# Patient Record
Sex: FEMALE | Race: BLACK OR AFRICAN AMERICAN | URBAN - METROPOLITAN AREA
[De-identification: names, ages, dates, MRNs, and addresses within clinical notes are randomized per-mention and may not be internally consistent; named-entity substitution may affect disease eponyms.]

---

## 2017-03-02 ENCOUNTER — OFFICE VISIT (OUTPATIENT)
Dept: INTERNAL MEDICINE CLINIC | Age: 67
End: 2017-03-02

## 2017-03-02 VITALS
WEIGHT: 127 LBS | OXYGEN SATURATION: 99 % | SYSTOLIC BLOOD PRESSURE: 118 MMHG | HEART RATE: 85 BPM | BODY MASS INDEX: 22.5 KG/M2 | DIASTOLIC BLOOD PRESSURE: 69 MMHG | TEMPERATURE: 96.5 F

## 2017-03-02 DIAGNOSIS — G30.0 EARLY ONSET ALZHEIMER'S DEMENTIA WITHOUT BEHAVIORAL DISTURBANCE (HCC): ICD-10-CM

## 2017-03-02 DIAGNOSIS — Z00.00 PREVENTATIVE HEALTH CARE: ICD-10-CM

## 2017-03-02 DIAGNOSIS — E83.52 HYPERCALCEMIA: ICD-10-CM

## 2017-03-02 DIAGNOSIS — F02.80 EARLY ONSET ALZHEIMER'S DEMENTIA WITHOUT BEHAVIORAL DISTURBANCE (HCC): ICD-10-CM

## 2017-03-02 DIAGNOSIS — Z12.31 ENCOUNTER FOR SCREENING MAMMOGRAM FOR MALIGNANT NEOPLASM OF BREAST: ICD-10-CM

## 2017-03-02 DIAGNOSIS — G93.32 CHRONIC FATIGUE SYNDROME: ICD-10-CM

## 2017-03-02 DIAGNOSIS — E55.9 VITAMIN D DEFICIENCY: ICD-10-CM

## 2017-03-02 DIAGNOSIS — M81.0 OSTEOPOROSIS: ICD-10-CM

## 2017-03-02 DIAGNOSIS — M81.0 OSTEOPOROSIS: Primary | ICD-10-CM

## 2017-03-02 DIAGNOSIS — R79.89 OTHER SPECIFIED ABNORMAL FINDINGS OF BLOOD CHEMISTRY: ICD-10-CM

## 2017-03-02 LAB
ALBUMIN SERPL-MCNC: 4.6 G/DL (ref 3.4–5)
ANION GAP SERPL CALCULATED.3IONS-SCNC: 12 MMOL/L (ref 3–16)
BASOPHILS ABSOLUTE: 0 K/UL (ref 0–0.2)
BASOPHILS RELATIVE PERCENT: 0.3 %
BUN BLDV-MCNC: 12 MG/DL (ref 7–20)
CALCIUM SERPL-MCNC: 10.1 MG/DL (ref 8.3–10.6)
CHLORIDE BLD-SCNC: 99 MMOL/L (ref 99–110)
CHOLESTEROL, TOTAL: 285 MG/DL (ref 0–199)
CO2: 26 MMOL/L (ref 21–32)
CREAT SERPL-MCNC: 0.7 MG/DL (ref 0.6–1.2)
EOSINOPHILS ABSOLUTE: 0.1 K/UL (ref 0–0.6)
EOSINOPHILS RELATIVE PERCENT: 1 %
GFR AFRICAN AMERICAN: >60
GFR NON-AFRICAN AMERICAN: >60
GLUCOSE BLD-MCNC: 84 MG/DL (ref 70–99)
HCT VFR BLD CALC: 43.9 % (ref 36–48)
HDLC SERPL-MCNC: 70 MG/DL (ref 40–60)
HEMOGLOBIN: 14.3 G/DL (ref 12–16)
LDL CHOLESTEROL CALCULATED: 181 MG/DL
LYMPHOCYTES ABSOLUTE: 3.3 K/UL (ref 1–5.1)
LYMPHOCYTES RELATIVE PERCENT: 38.3 %
MCH RBC QN AUTO: 29.9 PG (ref 26–34)
MCHC RBC AUTO-ENTMCNC: 32.6 G/DL (ref 31–36)
MCV RBC AUTO: 91.7 FL (ref 80–100)
MONOCYTES ABSOLUTE: 0.5 K/UL (ref 0–1.3)
MONOCYTES RELATIVE PERCENT: 5.7 %
NEUTROPHILS ABSOLUTE: 4.7 K/UL (ref 1.7–7.7)
NEUTROPHILS RELATIVE PERCENT: 54.7 %
PARATHYROID HORMONE INTACT: 115.7 PG/ML (ref 14–72)
PDW BLD-RTO: 14 % (ref 12.4–15.4)
PHOSPHORUS: 3.5 MG/DL (ref 2.5–4.9)
PLATELET # BLD: 277 K/UL (ref 135–450)
PMV BLD AUTO: 9.3 FL (ref 5–10.5)
POTASSIUM SERPL-SCNC: 4.8 MMOL/L (ref 3.5–5.1)
RBC # BLD: 4.78 M/UL (ref 4–5.2)
SODIUM BLD-SCNC: 137 MMOL/L (ref 136–145)
T4 FREE: 1.1 NG/DL (ref 0.9–1.8)
TRIGL SERPL-MCNC: 168 MG/DL (ref 0–150)
TSH SERPL DL<=0.05 MIU/L-ACNC: 1.5 UIU/ML (ref 0.27–4.2)
VLDLC SERPL CALC-MCNC: 34 MG/DL
WBC # BLD: 8.6 K/UL (ref 4–11)

## 2017-03-02 PROCEDURE — G8484 FLU IMMUNIZE NO ADMIN: HCPCS | Performed by: INTERNAL MEDICINE

## 2017-03-02 PROCEDURE — 3017F COLORECTAL CA SCREEN DOC REV: CPT | Performed by: INTERNAL MEDICINE

## 2017-03-02 PROCEDURE — 1036F TOBACCO NON-USER: CPT | Performed by: INTERNAL MEDICINE

## 2017-03-02 PROCEDURE — 4005F PHARM THX FOR OP RXD: CPT | Performed by: INTERNAL MEDICINE

## 2017-03-02 PROCEDURE — G8427 DOCREV CUR MEDS BY ELIG CLIN: HCPCS | Performed by: INTERNAL MEDICINE

## 2017-03-02 PROCEDURE — G8420 CALC BMI NORM PARAMETERS: HCPCS | Performed by: INTERNAL MEDICINE

## 2017-03-02 PROCEDURE — 99204 OFFICE O/P NEW MOD 45 MIN: CPT | Performed by: INTERNAL MEDICINE

## 2017-03-02 PROCEDURE — 3014F SCREEN MAMMO DOC REV: CPT | Performed by: INTERNAL MEDICINE

## 2017-03-02 RX ORDER — DONEPEZIL HYDROCHLORIDE 10 MG/1
10 TABLET, FILM COATED ORAL NIGHTLY
Qty: 90 TABLET | Refills: 1 | Status: SHIPPED | OUTPATIENT
Start: 2017-03-02

## 2017-03-02 RX ORDER — MULTIVIT-MIN/IRON/FOLIC ACID/K 18-600-40
1 CAPSULE ORAL DAILY
Qty: 90 CAPSULE | Refills: 3 | Status: SHIPPED | OUTPATIENT
Start: 2017-03-02

## 2017-03-02 RX ORDER — POLYETHYLENE GLYCOL 3350 17 G/17G
17 POWDER ORAL DAILY
Qty: 1 BOTTLE | Refills: 3 | Status: SHIPPED | OUTPATIENT
Start: 2017-03-02

## 2017-03-02 RX ORDER — ALENDRONATE SODIUM 70 MG/1
70 TABLET ORAL
Qty: 4 TABLET | Refills: 3 | Status: SHIPPED | OUTPATIENT
Start: 2017-03-02

## 2017-03-02 RX ORDER — MAGNESIUM OXIDE/MAG AA CHELATE 300 MG
1 CAPSULE ORAL DAILY
Qty: 90 CAPSULE | Refills: 1 | Status: SHIPPED | OUTPATIENT
Start: 2017-03-02 | End: 2017-03-02 | Stop reason: ALTCHOICE

## 2017-03-02 RX ORDER — MODAFINIL 100 MG/1
100 TABLET ORAL EVERY MORNING
Qty: 30 TABLET | Refills: 1 | Status: SHIPPED | OUTPATIENT
Start: 2017-03-02

## 2017-03-02 RX ORDER — ERGOCALCIFEROL (VITAMIN D2) 1250 MCG
50000 CAPSULE ORAL WEEKLY
Qty: 4 CAPSULE | Refills: 3 | Status: SHIPPED | OUTPATIENT
Start: 2017-03-02

## 2017-03-03 DIAGNOSIS — E83.52 HYPERCALCEMIA: Primary | ICD-10-CM

## 2017-03-03 DIAGNOSIS — E83.52 HYPERCALCEMIA: ICD-10-CM

## 2017-03-03 LAB
ALBUMIN SERPL-MCNC: 4.4 G/DL (ref 3.4–5)
ESTIMATED AVERAGE GLUCOSE: 105.4 MG/DL
HBA1C MFR BLD: 5.3 %
MAGNESIUM: 2 MG/DL (ref 1.8–2.4)
VITAMIN D 25-HYDROXY: 22.6 NG/ML

## 2017-03-04 LAB — RPR: NON REACTIVE

## 2017-03-06 RX ORDER — ATORVASTATIN CALCIUM 40 MG/1
40 TABLET, FILM COATED ORAL DAILY
Qty: 30 TABLET | Refills: 3 | Status: SHIPPED | OUTPATIENT
Start: 2017-03-06

## 2017-05-25 ENCOUNTER — TELEPHONE (OUTPATIENT)
Dept: INTERNAL MEDICINE CLINIC | Age: 67
End: 2017-05-25

## 2025-03-02 ENCOUNTER — APPOINTMENT (OUTPATIENT)
Dept: GENERAL RADIOLOGY | Age: 75
DRG: 194 | End: 2025-03-02
Payer: MEDICARE

## 2025-03-02 ENCOUNTER — APPOINTMENT (OUTPATIENT)
Dept: CT IMAGING | Age: 75
DRG: 194 | End: 2025-03-02
Payer: MEDICARE

## 2025-03-02 ENCOUNTER — HOSPITAL ENCOUNTER (INPATIENT)
Age: 75
LOS: 1 days | Discharge: HOME HEALTH | DRG: 194 | End: 2025-03-03
Attending: STUDENT IN AN ORGANIZED HEALTH CARE EDUCATION/TRAINING PROGRAM | Admitting: INTERNAL MEDICINE
Payer: MEDICARE

## 2025-03-02 DIAGNOSIS — R53.1 GENERAL WEAKNESS: ICD-10-CM

## 2025-03-02 DIAGNOSIS — J10.1 INFLUENZA A: Primary | ICD-10-CM
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
## 2025-03-02 LAB
ALBUMIN SERPL-MCNC: 4.1 G/DL (ref 3.4–5)
ALBUMIN/GLOB SERPL: 1.1 {RATIO} (ref 1.1–2.2)
ALP SERPL-CCNC: 103 U/L (ref 40–129)
ALT SERPL-CCNC: 18 U/L (ref 10–40)
ANION GAP SERPL CALCULATED.3IONS-SCNC: 12 MMOL/L (ref 3–16)
AST SERPL-CCNC: 45 U/L (ref 15–37)
BASE EXCESS BLDV CALC-SCNC: 0.2 MMOL/L (ref -2–3)
BASOPHILS # BLD: 0 K/UL (ref 0–0.2)
BASOPHILS NFR BLD: 0 %
BILIRUB SERPL-MCNC: 0.3 MG/DL (ref 0–1)
BILIRUB UR QL STRIP.AUTO: NEGATIVE
BUN SERPL-MCNC: 9 MG/DL (ref 7–20)
CALCIUM SERPL-MCNC: 9.6 MG/DL (ref 8.3–10.6)
CHLORIDE SERPL-SCNC: 99 MMOL/L (ref 99–110)
CLARITY UR: CLEAR
CO2 BLDV-SCNC: 27 MMOL/L
CO2 SERPL-SCNC: 22 MMOL/L (ref 21–32)
COHGB MFR BLDV: 1.1 % (ref 0–1.5)
COLOR UR: YELLOW
CREAT SERPL-MCNC: 0.8 MG/DL (ref 0.6–1.2)
DEPRECATED RDW RBC AUTO: 13.9 % (ref 12.4–15.4)
DO-HGB MFR BLDV: 27.3 %
EOSINOPHIL # BLD: 0 K/UL (ref 0–0.6)
EOSINOPHIL NFR BLD: 0.2 %
EPI CELLS #/AREA URNS HPF: NORMAL /HPF (ref 0–5)
FLUAV RNA RESP QL NAA+PROBE: DETECTED
FLUBV RNA RESP QL NAA+PROBE: NOT DETECTED
GFR SERPLBLD CREATININE-BSD FMLA CKD-EPI: 78 ML/MIN/{1.73_M2}
GLUCOSE SERPL-MCNC: 122 MG/DL (ref 70–99)
GLUCOSE UR STRIP.AUTO-MCNC: NEGATIVE MG/DL
HCO3 BLDV-SCNC: 26 MMOL/L (ref 24–28)
HCT VFR BLD AUTO: 45.4 % (ref 36–48)
HGB BLD-MCNC: 15.3 G/DL (ref 12–16)
HGB UR QL STRIP.AUTO: ABNORMAL
KETONES UR STRIP.AUTO-MCNC: NEGATIVE MG/DL
LACTATE BLDV-SCNC: 1.9 MMOL/L (ref 0.4–2)
LACTATE BLDV-SCNC: 3.3 MMOL/L (ref 0.4–1.9)
LACTATE BLDV-SCNC: 4.5 MMOL/L (ref 0.4–2)
LEUKOCYTE ESTERASE UR QL STRIP.AUTO: NEGATIVE
LYMPHOCYTES # BLD: 0.5 K/UL (ref 1–5.1)
LYMPHOCYTES NFR BLD: 6.6 %
MCH RBC QN AUTO: 30.7 PG (ref 26–34)
MCHC RBC AUTO-ENTMCNC: 33.7 G/DL (ref 31–36)
MCV RBC AUTO: 91.2 FL (ref 80–100)
METHGB MFR BLDV: 0.1 % (ref 0–1.5)
MONOCYTES # BLD: 0.6 K/UL (ref 0–1.3)
MONOCYTES NFR BLD: 7 %
NEUTROPHILS # BLD: 6.9 K/UL (ref 1.7–7.7)
NEUTROPHILS NFR BLD: 86.2 %
NITRITE UR QL STRIP.AUTO: NEGATIVE
PCO2 BLDV: 45.1 MMHG (ref 41–51)
PH BLDV: 7.37 [PH] (ref 7.35–7.45)
PH UR STRIP.AUTO: 6.5 [PH] (ref 5–8)
PLATELET # BLD AUTO: 233 K/UL (ref 135–450)
PMV BLD AUTO: 8.2 FL (ref 5–10.5)
PO2 BLDV: 38.6 MMHG (ref 25–40)
POTASSIUM SERPL-SCNC: 4.9 MMOL/L (ref 3.5–5.1)
PROCALCITONIN SERPL IA-MCNC: 0.04 NG/ML (ref 0–0.15)
PROT SERPL-MCNC: 7.9 G/DL (ref 6.4–8.2)
PROT UR STRIP.AUTO-MCNC: ABNORMAL MG/DL
RBC # BLD AUTO: 4.98 M/UL (ref 4–5.2)
RBC #/AREA URNS HPF: NORMAL /HPF (ref 0–4)
SAO2 % BLDV: 72 %
SARS-COV-2 RNA RESP QL NAA+PROBE: NOT DETECTED
SODIUM SERPL-SCNC: 133 MMOL/L (ref 136–145)
SP GR UR STRIP.AUTO: 1.02 (ref 1–1.03)
TROPONIN, HIGH SENSITIVITY: 7 NG/L (ref 0–14)
TROPONIN, HIGH SENSITIVITY: 8 NG/L (ref 0–14)
UA COMPLETE W REFLEX CULTURE PNL UR: ABNORMAL
UA DIPSTICK W REFLEX MICRO PNL UR: YES
URN SPEC COLLECT METH UR: ABNORMAL
UROBILINOGEN UR STRIP-ACNC: 0.2 E.U./DL
WBC # BLD AUTO: 8 K/UL (ref 4–11)
WBC #/AREA URNS HPF: NORMAL /HPF (ref 0–5)

## 2025-03-02 PROCEDURE — 36415 COLL VENOUS BLD VENIPUNCTURE: CPT

## 2025-03-02 PROCEDURE — 71260 CT THORAX DX C+: CPT

## 2025-03-02 PROCEDURE — 85025 COMPLETE CBC W/AUTO DIFF WBC: CPT

## 2025-03-02 PROCEDURE — 2580000003 HC RX 258: Performed by: STUDENT IN AN ORGANIZED HEALTH CARE EDUCATION/TRAINING PROGRAM

## 2025-03-02 PROCEDURE — 87636 SARSCOV2 & INF A&B AMP PRB: CPT

## 2025-03-02 PROCEDURE — 80053 COMPREHEN METABOLIC PANEL: CPT

## 2025-03-02 PROCEDURE — 81001 URINALYSIS AUTO W/SCOPE: CPT

## 2025-03-02 PROCEDURE — 1200000000 HC SEMI PRIVATE

## 2025-03-02 PROCEDURE — 2500000003 HC RX 250 WO HCPCS: Performed by: INTERNAL MEDICINE

## 2025-03-02 PROCEDURE — 71045 X-RAY EXAM CHEST 1 VIEW: CPT

## 2025-03-02 PROCEDURE — 84484 ASSAY OF TROPONIN QUANT: CPT

## 2025-03-02 PROCEDURE — 96360 HYDRATION IV INFUSION INIT: CPT

## 2025-03-02 PROCEDURE — 6370000000 HC RX 637 (ALT 250 FOR IP): Performed by: STUDENT IN AN ORGANIZED HEALTH CARE EDUCATION/TRAINING PROGRAM

## 2025-03-02 PROCEDURE — 6370000000 HC RX 637 (ALT 250 FOR IP)

## 2025-03-02 PROCEDURE — 70450 CT HEAD/BRAIN W/O DYE: CPT

## 2025-03-02 PROCEDURE — 84145 PROCALCITONIN (PCT): CPT

## 2025-03-02 PROCEDURE — 96361 HYDRATE IV INFUSION ADD-ON: CPT

## 2025-03-02 PROCEDURE — 82803 BLOOD GASES ANY COMBINATION: CPT

## 2025-03-02 PROCEDURE — 6360000004 HC RX CONTRAST MEDICATION: Performed by: STUDENT IN AN ORGANIZED HEALTH CARE EDUCATION/TRAINING PROGRAM

## 2025-03-02 PROCEDURE — 87040 BLOOD CULTURE FOR BACTERIA: CPT

## 2025-03-02 PROCEDURE — 99285 EMERGENCY DEPT VISIT HI MDM: CPT

## 2025-03-02 PROCEDURE — 83605 ASSAY OF LACTIC ACID: CPT

## 2025-03-02 PROCEDURE — 93005 ELECTROCARDIOGRAM TRACING: CPT | Performed by: STUDENT IN AN ORGANIZED HEALTH CARE EDUCATION/TRAINING PROGRAM

## 2025-03-02 RX ORDER — ATORVASTATIN CALCIUM 40 MG/1
40 TABLET, FILM COATED ORAL DAILY
Status: CANCELLED | OUTPATIENT
Start: 2025-03-03

## 2025-03-02 RX ORDER — POTASSIUM CHLORIDE 1500 MG/1
40 TABLET, EXTENDED RELEASE ORAL PRN
Status: DISCONTINUED | OUTPATIENT
Start: 2025-03-02 | End: 2025-03-02

## 2025-03-02 RX ORDER — SODIUM CHLORIDE 0.9 % (FLUSH) 0.9 %
5-40 SYRINGE (ML) INJECTION EVERY 12 HOURS SCHEDULED
Status: DISCONTINUED | OUTPATIENT
Start: 2025-03-02 | End: 2025-03-03 | Stop reason: HOSPADM

## 2025-03-02 RX ORDER — IOPAMIDOL 755 MG/ML
75 INJECTION, SOLUTION INTRAVASCULAR
Status: COMPLETED | OUTPATIENT
Start: 2025-03-02 | End: 2025-03-02

## 2025-03-02 RX ORDER — ACETAMINOPHEN 650 MG/1
650 SUPPOSITORY RECTAL EVERY 6 HOURS PRN
Status: DISCONTINUED | OUTPATIENT
Start: 2025-03-02 | End: 2025-03-02

## 2025-03-02 RX ORDER — POLYETHYLENE GLYCOL 3350 17 G/17G
17 POWDER, FOR SOLUTION ORAL DAILY PRN
Status: DISCONTINUED | OUTPATIENT
Start: 2025-03-02 | End: 2025-03-02

## 2025-03-02 RX ORDER — MAGNESIUM SULFATE IN WATER 40 MG/ML
2000 INJECTION, SOLUTION INTRAVENOUS PRN
Status: DISCONTINUED | OUTPATIENT
Start: 2025-03-02 | End: 2025-03-02

## 2025-03-02 RX ORDER — MAGNESIUM SULFATE IN WATER 40 MG/ML
2000 INJECTION, SOLUTION INTRAVENOUS PRN
Status: DISCONTINUED | OUTPATIENT
Start: 2025-03-02 | End: 2025-03-03 | Stop reason: HOSPADM

## 2025-03-02 RX ORDER — OSELTAMIVIR PHOSPHATE 75 MG/1
75 CAPSULE ORAL ONCE
Status: COMPLETED | OUTPATIENT
Start: 2025-03-02 | End: 2025-03-02

## 2025-03-02 RX ORDER — VITAMIN B COMPLEX
2000 TABLET ORAL DAILY
Status: DISCONTINUED | OUTPATIENT
Start: 2025-03-03 | End: 2025-03-03 | Stop reason: HOSPADM

## 2025-03-02 RX ORDER — ACETAMINOPHEN 325 MG/1
650 TABLET ORAL EVERY 6 HOURS PRN
Status: DISCONTINUED | OUTPATIENT
Start: 2025-03-02 | End: 2025-03-02

## 2025-03-02 RX ORDER — POLYETHYLENE GLYCOL 3350 17 G/17G
17 POWDER, FOR SOLUTION ORAL DAILY PRN
Status: DISCONTINUED | OUTPATIENT
Start: 2025-03-02 | End: 2025-03-03 | Stop reason: HOSPADM

## 2025-03-02 RX ORDER — MODAFINIL 100 MG/1
100 TABLET ORAL EVERY MORNING
Status: DISCONTINUED | OUTPATIENT
Start: 2025-03-03 | End: 2025-03-03 | Stop reason: HOSPADM

## 2025-03-02 RX ORDER — SODIUM CHLORIDE 0.9 % (FLUSH) 0.9 %
5-40 SYRINGE (ML) INJECTION PRN
Status: DISCONTINUED | OUTPATIENT
Start: 2025-03-02 | End: 2025-03-02

## 2025-03-02 RX ORDER — DONEPEZIL HYDROCHLORIDE 10 MG/1
10 TABLET, FILM COATED ORAL NIGHTLY
Status: DISCONTINUED | OUTPATIENT
Start: 2025-03-02 | End: 2025-03-03 | Stop reason: HOSPADM

## 2025-03-02 RX ORDER — ENOXAPARIN SODIUM 100 MG/ML
40 INJECTION SUBCUTANEOUS DAILY
Status: DISCONTINUED | OUTPATIENT
Start: 2025-03-03 | End: 2025-03-02

## 2025-03-02 RX ORDER — NEPHROCAP 1 MG
1 CAP ORAL DAILY
Status: DISCONTINUED | OUTPATIENT
Start: 2025-03-03 | End: 2025-03-03 | Stop reason: HOSPADM

## 2025-03-02 RX ORDER — ACETAMINOPHEN 650 MG/1
650 SUPPOSITORY RECTAL EVERY 6 HOURS PRN
Status: DISCONTINUED | OUTPATIENT
Start: 2025-03-02 | End: 2025-03-03 | Stop reason: HOSPADM

## 2025-03-02 RX ORDER — POTASSIUM CHLORIDE 7.45 MG/ML
10 INJECTION INTRAVENOUS PRN
Status: DISCONTINUED | OUTPATIENT
Start: 2025-03-02 | End: 2025-03-02

## 2025-03-02 RX ORDER — SODIUM CHLORIDE, SODIUM LACTATE, POTASSIUM CHLORIDE, AND CALCIUM CHLORIDE .6; .31; .03; .02 G/100ML; G/100ML; G/100ML; G/100ML
1000 INJECTION, SOLUTION INTRAVENOUS ONCE
Status: COMPLETED | OUTPATIENT
Start: 2025-03-02 | End: 2025-03-02

## 2025-03-02 RX ORDER — ONDANSETRON 2 MG/ML
4 INJECTION INTRAMUSCULAR; INTRAVENOUS EVERY 6 HOURS PRN
Status: DISCONTINUED | OUTPATIENT
Start: 2025-03-02 | End: 2025-03-02

## 2025-03-02 RX ORDER — ACETAMINOPHEN 325 MG/1
650 TABLET ORAL EVERY 6 HOURS PRN
Status: DISCONTINUED | OUTPATIENT
Start: 2025-03-02 | End: 2025-03-03 | Stop reason: HOSPADM

## 2025-03-02 RX ORDER — 0.9 % SODIUM CHLORIDE 0.9 %
1000 INTRAVENOUS SOLUTION INTRAVENOUS ONCE
Status: COMPLETED | OUTPATIENT
Start: 2025-03-02 | End: 2025-03-02

## 2025-03-02 RX ORDER — POLYETHYLENE GLYCOL 3350 17 G/17G
17 POWDER, FOR SOLUTION ORAL DAILY
Status: DISCONTINUED | OUTPATIENT
Start: 2025-03-03 | End: 2025-03-03 | Stop reason: HOSPADM

## 2025-03-02 RX ORDER — ENOXAPARIN SODIUM 100 MG/ML
40 INJECTION SUBCUTANEOUS DAILY
Status: DISCONTINUED | OUTPATIENT
Start: 2025-03-03 | End: 2025-03-03 | Stop reason: HOSPADM

## 2025-03-02 RX ORDER — SODIUM CHLORIDE 0.9 % (FLUSH) 0.9 %
5-40 SYRINGE (ML) INJECTION EVERY 12 HOURS SCHEDULED
Status: DISCONTINUED | OUTPATIENT
Start: 2025-03-02 | End: 2025-03-02

## 2025-03-02 RX ORDER — ONDANSETRON 2 MG/ML
4 INJECTION INTRAMUSCULAR; INTRAVENOUS EVERY 6 HOURS PRN
Status: DISCONTINUED | OUTPATIENT
Start: 2025-03-02 | End: 2025-03-03 | Stop reason: HOSPADM

## 2025-03-02 RX ORDER — SODIUM CHLORIDE 9 MG/ML
INJECTION, SOLUTION INTRAVENOUS PRN
Status: DISCONTINUED | OUTPATIENT
Start: 2025-03-02 | End: 2025-03-03 | Stop reason: HOSPADM

## 2025-03-02 RX ORDER — SODIUM CHLORIDE 0.9 % (FLUSH) 0.9 %
5-40 SYRINGE (ML) INJECTION PRN
Status: DISCONTINUED | OUTPATIENT
Start: 2025-03-02 | End: 2025-03-03 | Stop reason: HOSPADM

## 2025-03-02 RX ORDER — OSELTAMIVIR PHOSPHATE 30 MG/1
30 CAPSULE ORAL 2 TIMES DAILY
Status: DISCONTINUED | OUTPATIENT
Start: 2025-03-03 | End: 2025-03-03 | Stop reason: HOSPADM

## 2025-03-02 RX ORDER — ONDANSETRON 4 MG/1
4 TABLET, ORALLY DISINTEGRATING ORAL EVERY 8 HOURS PRN
Status: DISCONTINUED | OUTPATIENT
Start: 2025-03-02 | End: 2025-03-02

## 2025-03-02 RX ORDER — POTASSIUM CHLORIDE 7.45 MG/ML
10 INJECTION INTRAVENOUS PRN
Status: DISCONTINUED | OUTPATIENT
Start: 2025-03-02 | End: 2025-03-03 | Stop reason: HOSPADM

## 2025-03-02 RX ORDER — ONDANSETRON 4 MG/1
4 TABLET, ORALLY DISINTEGRATING ORAL EVERY 8 HOURS PRN
Status: DISCONTINUED | OUTPATIENT
Start: 2025-03-02 | End: 2025-03-03 | Stop reason: HOSPADM

## 2025-03-02 RX ORDER — POTASSIUM CHLORIDE 1500 MG/1
40 TABLET, EXTENDED RELEASE ORAL PRN
Status: DISCONTINUED | OUTPATIENT
Start: 2025-03-02 | End: 2025-03-03 | Stop reason: HOSPADM

## 2025-03-02 RX ADMIN — IOPAMIDOL 75 ML: 755 INJECTION, SOLUTION INTRAVENOUS at 18:47

## 2025-03-02 RX ADMIN — OSELTAMIVIR PHOSPHATE 75 MG: 75 CAPSULE ORAL at 22:14

## 2025-03-02 RX ADMIN — SODIUM CHLORIDE 1000 ML: 0.9 INJECTION, SOLUTION INTRAVENOUS at 19:21

## 2025-03-02 RX ADMIN — DONEPEZIL HYDROCHLORIDE 10 MG: 10 TABLET ORAL at 23:36

## 2025-03-02 RX ADMIN — MAGNESIUM HYDROXIDE 10 ML: 400 SUSPENSION ORAL at 23:36

## 2025-03-02 RX ADMIN — SODIUM CHLORIDE, SODIUM LACTATE, POTASSIUM CHLORIDE, AND CALCIUM CHLORIDE 1000 ML: .6; .31; .03; .02 INJECTION, SOLUTION INTRAVENOUS at 15:18

## 2025-03-02 RX ADMIN — SODIUM CHLORIDE, PRESERVATIVE FREE 10 ML: 5 INJECTION INTRAVENOUS at 23:35

## 2025-03-02 ASSESSMENT — PAIN - FUNCTIONAL ASSESSMENT: PAIN_FUNCTIONAL_ASSESSMENT: NONE - DENIES PAIN

## 2025-03-03 VITALS
RESPIRATION RATE: 18 BRPM | HEART RATE: 104 BPM | TEMPERATURE: 97.9 F | HEIGHT: 61 IN | BODY MASS INDEX: 24.31 KG/M2 | WEIGHT: 128.75 LBS | DIASTOLIC BLOOD PRESSURE: 90 MMHG | OXYGEN SATURATION: 97 % | SYSTOLIC BLOOD PRESSURE: 123 MMHG

## 2025-03-03 LAB
ANION GAP SERPL CALCULATED.3IONS-SCNC: 13 MMOL/L (ref 3–16)
BASOPHILS # BLD: 0 K/UL (ref 0–0.2)
BASOPHILS NFR BLD: 0.2 %
BUN SERPL-MCNC: 11 MG/DL (ref 7–20)
CALCIUM SERPL-MCNC: 9.3 MG/DL (ref 8.3–10.6)
CHLORIDE SERPL-SCNC: 100 MMOL/L (ref 99–110)
CO2 SERPL-SCNC: 21 MMOL/L (ref 21–32)
CREAT SERPL-MCNC: 0.8 MG/DL (ref 0.6–1.2)
DEPRECATED RDW RBC AUTO: 13.7 % (ref 12.4–15.4)
EKG ATRIAL RATE: 120 BPM
EKG DIAGNOSIS: NORMAL
EKG P AXIS: 72 DEGREES
EKG P-R INTERVAL: 134 MS
EKG Q-T INTERVAL: 316 MS
EKG QRS DURATION: 64 MS
EKG QTC CALCULATION (BAZETT): 446 MS
EKG R AXIS: 48 DEGREES
EKG T AXIS: 4 DEGREES
EKG VENTRICULAR RATE: 120 BPM
EOSINOPHIL # BLD: 0 K/UL (ref 0–0.6)
EOSINOPHIL NFR BLD: 0 %
GFR SERPLBLD CREATININE-BSD FMLA CKD-EPI: 78 ML/MIN/{1.73_M2}
GLUCOSE SERPL-MCNC: 176 MG/DL (ref 70–99)
HCT VFR BLD AUTO: 44.1 % (ref 36–48)
HGB BLD-MCNC: 14.4 G/DL (ref 12–16)
LYMPHOCYTES # BLD: 0.8 K/UL (ref 1–5.1)
LYMPHOCYTES NFR BLD: 13 %
MCH RBC QN AUTO: 30 PG (ref 26–34)
MCHC RBC AUTO-ENTMCNC: 32.7 G/DL (ref 31–36)
MCV RBC AUTO: 91.8 FL (ref 80–100)
MONOCYTES # BLD: 0.4 K/UL (ref 0–1.3)
MONOCYTES NFR BLD: 6.7 %
NEUTROPHILS # BLD: 5 K/UL (ref 1.7–7.7)
NEUTROPHILS NFR BLD: 80.1 %
PLATELET # BLD AUTO: 216 K/UL (ref 135–450)
PMV BLD AUTO: 8.4 FL (ref 5–10.5)
POTASSIUM SERPL-SCNC: 4.3 MMOL/L (ref 3.5–5.1)
RBC # BLD AUTO: 4.81 M/UL (ref 4–5.2)
SODIUM SERPL-SCNC: 134 MMOL/L (ref 136–145)
WBC # BLD AUTO: 6.2 K/UL (ref 4–11)

## 2025-03-03 PROCEDURE — 97166 OT EVAL MOD COMPLEX 45 MIN: CPT

## 2025-03-03 PROCEDURE — 6360000002 HC RX W HCPCS: Performed by: INTERNAL MEDICINE

## 2025-03-03 PROCEDURE — 97162 PT EVAL MOD COMPLEX 30 MIN: CPT

## 2025-03-03 PROCEDURE — 2500000003 HC RX 250 WO HCPCS: Performed by: INTERNAL MEDICINE

## 2025-03-03 PROCEDURE — 85025 COMPLETE CBC W/AUTO DIFF WBC: CPT

## 2025-03-03 PROCEDURE — 97116 GAIT TRAINING THERAPY: CPT

## 2025-03-03 PROCEDURE — 97530 THERAPEUTIC ACTIVITIES: CPT

## 2025-03-03 PROCEDURE — 36415 COLL VENOUS BLD VENIPUNCTURE: CPT

## 2025-03-03 PROCEDURE — 6370000000 HC RX 637 (ALT 250 FOR IP)

## 2025-03-03 PROCEDURE — 80048 BASIC METABOLIC PNL TOTAL CA: CPT

## 2025-03-03 PROCEDURE — 97535 SELF CARE MNGMENT TRAINING: CPT

## 2025-03-03 RX ORDER — OSELTAMIVIR PHOSPHATE 30 MG/1
30 CAPSULE ORAL 2 TIMES DAILY
Qty: 10 CAPSULE | Refills: 0 | Status: SHIPPED | OUTPATIENT
Start: 2025-03-03 | End: 2025-03-08

## 2025-03-03 RX ADMIN — MAGNESIUM HYDROXIDE 10 ML: 400 SUSPENSION ORAL at 08:41

## 2025-03-03 RX ADMIN — ENOXAPARIN SODIUM 40 MG: 100 INJECTION SUBCUTANEOUS at 08:41

## 2025-03-03 RX ADMIN — Medication 1 MG: at 08:41

## 2025-03-03 RX ADMIN — MODAFINIL 100 MG: 100 TABLET ORAL at 15:33

## 2025-03-03 RX ADMIN — CHOLECALCIFEROL TAB 25 MCG (1000 UNIT) 2000 UNITS: 25 TAB at 08:41

## 2025-03-03 RX ADMIN — POLYETHYLENE GLYCOL 3350 17 G: 17 POWDER, FOR SOLUTION ORAL at 08:41

## 2025-03-03 RX ADMIN — SODIUM CHLORIDE, PRESERVATIVE FREE 10 ML: 5 INJECTION INTRAVENOUS at 08:41

## 2025-03-03 RX ADMIN — OSELTAMIVIR PHOSPHATE 30 MG: 30 CAPSULE ORAL at 15:33

## 2025-03-03 NOTE — H&P
hemorrhage. Moderate patchy hypoattenuation within the white matter. Moderate enlargement of the ventricles and extra-axial spaces. The globes and orbits appear normal. The paranasal sinuses are clear. No abnormality of the calvarium.     No evidence of intracranial hemorrhage, mass, or CT evidence of acute stroke. Electronically signed by Real Smart MD        Electronically signed by DALILA MCCALLUM MD on 3/2/2025 at 10:29 PM

## 2025-03-03 NOTE — DISCHARGE INSTRUCTIONS
You were diagnosed with influenza A infection  Complete tamiflu for 5 days  Keep yourself hydrated and ensure good oral nutrition

## 2025-03-03 NOTE — PLAN OF CARE
[facility] Hospitalist brief note  Consult received.  Case reviewed with ER physician- flu, failure to thrive, placement    Full note to follow.    Ignacio Poe MD    Thanks  DALILA MCCALLUM MD

## 2025-03-03 NOTE — ED NOTES
RN screened patient's swallowing by administering the Mchenry Swallow Protocol. The patient consumed 3 ounces of water by cup in sequential swallows without signs/symptoms of aspiration.      Karen Francis RN  03/02/25 2055

## 2025-03-03 NOTE — ED PROVIDER NOTES
Allergies.    Physical Exam     INITIAL VITALS: BP: (!) 167/69, Temp: 99 °F (37.2 °C), Pulse: (!) 124, Respirations: 12, SpO2: 100 %     General:  Well appearing. No acute distress.  Non-toxic appearing  Eyes:  Pupils equally round, reactive, brisk. No discharge from eyes.   ENT:  No discharge from nose. OP clear.  Neck:  Supple. Nontender.  Pulmonary:   Non-labored breathing. Breath sounds clear bilaterally.  Cardiac: Tachycardic with regular rate.   Abdomen:  Soft. Non-tender. Non-distended.   Musculoskeletal:  No long bone deformity.    Vascular:  Extremities warm and perfused.  Skin:  No rash. Warm.  Neuro: Alert and oriented to person, time and place CN II-XII intact. Speech and mentation normal.  5 out of 5 strength of bilateral upper extremities.  Full strength to bilateral hip flexion extension, ankle dorsiflexion plantarflexion.  4-5 strength to bilateral hip flexion.  No clonus bilaterally  Extremities:  No peripheral edema. LE symmetric.               Larry Escamilla MD  03/02/25 1640

## 2025-03-03 NOTE — CARE COORDINATION
([facility] arrpox 40hr/wk/M-Sun.)    PT AM-PAC:   /24  OT AM-PAC: 18 /24    Family can provide assistance at DC: Yes  Would you like Case Management to discuss the discharge plan with any other family members/significant others, and if so, who? No  Plans to Return to Present Housing: Yes  Other Identified Issues/Barriers to RETURNING to current housing: None  Potential Assistance needed at discharge: Home Care            Potential DME:    Patient expects to discharge to: Independent living facility ([facility] arrpox 40hr/wk/M-Sun.)  Plan for transportation at discharge: Family    Financial    Payor: MEDICARE / Plan: MEDICARE PART A AND B / Product Type: *No Product type* /     Does insurance require precert for SNF: No    Potential assistance Purchasing Medications: No  Meds-to-Beds request:        [facility] [id number] [facility] [address] [facility] [phone] - F [phone]  [address] [place] [address]  Phone: [phone] Fax: [phone]      Notes:    Factors facilitating achievement of predicted outcomes: Family support    Barriers to discharge: FLU A, MS, Fall    Additional Case Management Notes: Patient is from [facility] arrpox 40hr/wk/M-Sun. Patient is A&OX4. Uses walker at baseline.  Active with PCP.  Mother and HHA at bedside.  Plan is to return home [facility] for PT/OT.  Family to transport.    The Plan for Transition of Care is related to the following treatment goals of Influenza A [J10.1]  General weakness [R53.1]  Fall (on) (from) other stairs and steps, initial encounter [W10.8XXA]    IF APPLICABLE: The Patient and/or patient representative Ivonne and her family were provided with a choice of provider and agrees with the discharge plan. Freedom of choice list with basic dialogue that supports the patient's individualized plan of care/goals and shares the quality data associated with the providers was provided to: Patient   Patient

## 2025-03-03 NOTE — ED NOTES
Patient Name: Ivonne Garcia  : 1950 74 y.o.  MRN: [id number]  ED Room #: B23/B23-23     Chief complaint:   Chief Complaint   Patient presents with    Fall     Pt's family called squad for generalized weakness and falls at home. Pt denies pain. Squad denies LOC.      Hospital Problem/Diagnosis:   Hospital Problems             Last Modified POA    * (Principal) Fall (on) (from) other stairs and steps, initial encounter 3/2/2025 Yes         O2 Flow Rate:O2 Device: None (Room air)   (if applicable)  Cardiac Rhythm:   (if applicable)  Active LDA's:   Peripheral IV 25 Distal;Right Forearm (Active)   Site Assessment Clean, dry & intact 25 1501   Line Status Blood return noted 25 1501            How does patient ambulate? Unknown, did not assess in the Emergency Department Per Previous RN, Patient does ambulate with assistance at home.     2. How does patient take pills? Whole with Water    3. Is patient alert? Alert    4. Is patient oriented? Confused    5.   Patient arrived from:  Nursing home  Facility Name: ___________________________________________    6. If patient is disoriented or from a Skill Nursing Facility has family been notified of admission?  Nursing home    7. Patient belongings? Belongings: Clothing    Disposition of belongings? Kept with Patient     8. Any specific patient or family belongings/needs/dynamics?   a. Patient has hx of dementia, MS, Lives in independent living at facility.     9. Miscellaneous comments/pending orders?  a. All ED orders are complete      If there are any additional questions please reach out to the Emergency Department.      Karen Francis, RN  [phone]

## 2025-03-04 NOTE — PROGRESS NOTES
4 Eyes Skin Assessment     NAME:  Ivonne Garcia  YOB: 1950  MEDICAL RECORD NUMBER:  [id number]    The patient is being assessed for  Admission    I agree that at least one RN has performed a thorough Head to Toe Skin Assessment on the patient. ALL assessment sites listed below have been assessed.      Areas assessed by both nurses:    Head, Face, Ears, Shoulders, Back, Chest, Arms, Elbows, Hands, Sacrum. Buttock, Coccyx, Ischium, Legs. Feet and Heels, and Under Medical Devices         Does the Patient have a Wound? No noted wound(s)       Cesar Prevention initiated by RN: Yes  Wound Care Orders initiated by RN: No    Pressure Injury (Stage 3,4, Unstageable, DTI, NWPT, and Complex wounds) if present, place Wound referral order by RN under : No    New Ostomies, if present place, Ostomy referral order under : No     Nurse 1 eSignature: Electronically signed by Tameka Wong RN on 3/3/25 at 12:46 AM EST    **SHARE this note so that the co-signing nurse can place an eSignature**    Nurse 2 eSignature:   Electronically signed by Raven Maldonado RN on 3/3/25 at 3:02 AM EST   
Discharge instructions reviewed at the bedside with pt and family. All questions and concerns addressed. Pt belongings gathered. Pt leaving with walker. IV removed and no complications presented. Assisted pt with getting dressed. Pt was taken downstairs via wheelchair and left in car with family driving.   
Physical Therapy  Facility/Department: [address]  Physical Therapy Initial Assessment and Treatment    Name: Ivonne Garcia  : 1950  MRN: [id number]  Date of Service: 3/3/2025    Discharge Recommendations:  Home with Home health PT, 24 hour supervision or assist    DME - none needed      Patient Diagnosis(es): The primary encounter diagnosis was Influenza A. A diagnosis of General weakness was also pertinent to this visit.  Past Medical History:  has a past medical history of Hyperlipidemia and MS (multiple sclerosis) (HCC).  Past Surgical History:  has no past surgical history on file.    Assessment  Assessment: Pt lives with mother in I living at the [place] and has HHA assist for approx 7 hours per day to help with mobility, ADL and homemaking.  Pt plans to return home with continued 24 hr A of mother as well as HHAs.  Recommend continued therapies to maxmize mobility, safety and independence  Treatment Diagnosis: Decreased functional mobility  Decision Making: Medium Complexity  Barriers to Learning: cognition  Activity Tolerance  Activity Tolerance: Patient tolerated treatment well;Patient tolerated evaluation without incident    Plan  Physical Therapy Plan  General Plan: Other (See Comment) (2-5)  Current Treatment Recommendations: Functional mobility training, Transfer training, Endurance training, Gait training, Stair training, Safety education & training, Patient/Caregiver education & training, Equipment evaluation, education, & procurement  Safety Devices  Type of Devices: Call light within reach, Nurse notified, Chair alarm in place, Left in chair (mother and aide present)    Restrictions  Position Activity Restriction  Other Position/Activity Restrictions: up as tolerated     Subjective  General  Patient assessed for rehabilitation services?: Yes  Additional Pertinent Hx: Adm 3/2 with general weakness.  History of MS  Referring Practitioner: Raissa  Diagnosis: General 
toileting w/ SBA  Short Term Goal 2: Pt will perform toilet transfer w/ SBA  Short Term Goal 3: Pt will perform fx mobility to and from bathroom w/ SBA  Patient Goals   Patient goals : not stated      Therapy Time   Individual Concurrent Group Co-treatment   Time In 1044         Time Out 1130         Minutes 46         Timed Code Treatment Minutes: 31 Minutes (+ 15 min OT zeke)       Trena Taylor, OT

## 2025-03-05 NOTE — DISCHARGE INSTR - COC
Address:  Phone:  Fax:    Dialysis Facility (if applicable)   Name:  Address:  Dialysis Schedule:  Phone:  Fax:    / signature: {Esignature:[id number]}    PHYSICIAN SECTION    Prognosis: {Prognosis:4528015122}    Condition at Discharge: { Patient Condition:387619746}    Rehab Potential (if transferring to Rehab): {Prognosis:5293740816}    Recommended Labs or Other Treatments After Discharge: ***    Physician Certification: I certify the above information and transfer of Ivonne Garcia  is necessary for the continuing treatment of the diagnosis listed and that she requires {Admit to Appropriate Level of Care:03288} for {GREATER/LESS:055277608} 30 days.     Update Admission H&P: {CHP DME Changes in HandP:814181487}    PHYSICIAN SIGNATURE:  {Esignature:[id number]}

## 2025-03-06 LAB — BACTERIA BLD CULT: NORMAL

## 2025-03-07 LAB — BACTERIA BLD CULT ORG #2: NORMAL

## 2025-04-18 ENCOUNTER — APPOINTMENT (OUTPATIENT)
Dept: CT IMAGING | Age: 75
DRG: 871 | End: 2025-04-18
Payer: MEDICARE

## 2025-04-18 ENCOUNTER — APPOINTMENT (OUTPATIENT)
Dept: GENERAL RADIOLOGY | Age: 75
DRG: 871 | End: 2025-04-18
Payer: MEDICARE

## 2025-04-18 ENCOUNTER — HOSPITAL ENCOUNTER (INPATIENT)
Age: 75
LOS: 3 days | Discharge: NURSING, REHAB OR LONG-TERM CARE | DRG: 871 | End: 2025-04-21
Attending: STUDENT IN AN ORGANIZED HEALTH CARE EDUCATION/TRAINING PROGRAM | Admitting: STUDENT IN AN ORGANIZED HEALTH CARE EDUCATION/TRAINING PROGRAM
Payer: MEDICARE

## 2025-04-18 DIAGNOSIS — R53.83 LETHARGY: ICD-10-CM

## 2025-04-18 DIAGNOSIS — R50.9 FEVER, UNSPECIFIED FEVER CAUSE: Primary | ICD-10-CM

## 2025-04-18 DIAGNOSIS — J18.9 PNEUMONIA OF LEFT LUNG DUE TO INFECTIOUS ORGANISM, UNSPECIFIED PART OF LUNG: ICD-10-CM

## 2025-04-18 DIAGNOSIS — R00.0 TACHYCARDIA: ICD-10-CM

## 2025-04-18 DIAGNOSIS — A41.9 SEPSIS WITHOUT ACUTE ORGAN DYSFUNCTION, DUE TO UNSPECIFIED ORGANISM (HCC): ICD-10-CM

## 2025-04-18 PROBLEM — F33.9 RECURRENT MAJOR DEPRESSIVE DISORDER: Status: ACTIVE | Noted: 2018-04-18

## 2025-04-18 PROBLEM — E78.5 DYSLIPIDEMIA: Status: ACTIVE | Noted: 2018-04-20

## 2025-04-18 LAB
ALBUMIN SERPL-MCNC: 3.9 G/DL (ref 3.4–5)
ALP SERPL-CCNC: 95 U/L (ref 40–129)
ALT SERPL-CCNC: ABNORMAL U/L (ref 10–40)
ANION GAP SERPL CALCULATED.3IONS-SCNC: 15 MMOL/L (ref 3–16)
AST SERPL-CCNC: 53 U/L (ref 15–37)
BASE EXCESS BLDV CALC-SCNC: 0 MMOL/L (ref -2–3)
BASOPHILS # BLD: 0 K/UL (ref 0–0.2)
BASOPHILS NFR BLD: 0.3 %
BILIRUB DIRECT SERPL-MCNC: ABNORMAL MG/DL (ref 0–0.3)
BILIRUB INDIRECT SERPL-MCNC: ABNORMAL MG/DL (ref 0–1)
BILIRUB SERPL-MCNC: 0.3 MG/DL (ref 0–1)
BILIRUB UR QL STRIP.AUTO: NEGATIVE
BUN SERPL-MCNC: 9 MG/DL (ref 7–20)
CALCIUM SERPL-MCNC: 9 MG/DL (ref 8.3–10.6)
CHLORIDE SERPL-SCNC: 97 MMOL/L (ref 99–110)
CLARITY UR: CLEAR
CO2 BLDV-SCNC: 24 MMOL/L
CO2 SERPL-SCNC: 19 MMOL/L (ref 21–32)
COHGB MFR BLDV: 1.3 % (ref 0–1.5)
COLOR UR: YELLOW
CREAT SERPL-MCNC: 0.8 MG/DL (ref 0.6–1.2)
DEPRECATED RDW RBC AUTO: 13.9 % (ref 12.4–15.4)
DO-HGB MFR BLDV: 10.3 %
EKG ATRIAL RATE: 151 BPM
EKG DIAGNOSIS: NORMAL
EKG P-R INTERVAL: 112 MS
EKG Q-T INTERVAL: 324 MS
EKG QRS DURATION: 64 MS
EKG QTC CALCULATION (BAZETT): 513 MS
EKG R AXIS: 17 DEGREES
EKG T AXIS: -34 DEGREES
EKG VENTRICULAR RATE: 151 BPM
EOSINOPHIL # BLD: 0 K/UL (ref 0–0.6)
EOSINOPHIL NFR BLD: 0 %
FLUAV RNA RESP QL NAA+PROBE: NOT DETECTED
FLUBV RNA RESP QL NAA+PROBE: NOT DETECTED
GFR SERPLBLD CREATININE-BSD FMLA CKD-EPI: 78 ML/MIN/{1.73_M2}
GLUCOSE SERPL-MCNC: 164 MG/DL (ref 70–99)
GLUCOSE UR STRIP.AUTO-MCNC: NEGATIVE MG/DL
HCO3 BLDV-SCNC: 22.8 MMOL/L (ref 24–28)
HCT VFR BLD AUTO: 43.3 % (ref 36–48)
HGB BLD-MCNC: 14.7 G/DL (ref 12–16)
HGB UR QL STRIP.AUTO: ABNORMAL
INR PPP: 1.13 (ref 0.85–1.15)
KETONES UR STRIP.AUTO-MCNC: NEGATIVE MG/DL
LACTATE BLDV-SCNC: 1.8 MMOL/L (ref 0.4–1.9)
LEUKOCYTE ESTERASE UR QL STRIP.AUTO: NEGATIVE
LIPASE SERPL-CCNC: 10 U/L (ref 13–60)
LYMPHOCYTES # BLD: 0.4 K/UL (ref 1–5.1)
LYMPHOCYTES NFR BLD: 3.4 %
MCH RBC QN AUTO: 30.3 PG (ref 26–34)
MCHC RBC AUTO-ENTMCNC: 34 G/DL (ref 31–36)
MCV RBC AUTO: 89 FL (ref 80–100)
METHGB MFR BLDV: 0.1 % (ref 0–1.5)
MONOCYTES # BLD: 0.4 K/UL (ref 0–1.3)
MONOCYTES NFR BLD: 3.5 %
NEUTROPHILS # BLD: 10 K/UL (ref 1.7–7.7)
NEUTROPHILS NFR BLD: 92.8 %
NITRITE UR QL STRIP.AUTO: NEGATIVE
NT-PROBNP SERPL-MCNC: 1219 PG/ML (ref 0–124)
PCO2 BLDV: 31 MMHG (ref 41–51)
PH BLDV: 7.47 [PH] (ref 7.35–7.45)
PH UR STRIP.AUTO: 7 [PH] (ref 5–8)
PLATELET # BLD AUTO: 258 K/UL (ref 135–450)
PMV BLD AUTO: 8.4 FL (ref 5–10.5)
PO2 BLDV: 51.1 MMHG (ref 25–40)
POTASSIUM SERPL-SCNC: ABNORMAL MMOL/L (ref 3.5–5.1)
PROT SERPL-MCNC: 7.8 G/DL (ref 6.4–8.2)
PROT UR STRIP.AUTO-MCNC: NEGATIVE MG/DL
PROTHROMBIN TIME: 14.7 SEC (ref 11.9–14.9)
RBC # BLD AUTO: 4.86 M/UL (ref 4–5.2)
RBC #/AREA URNS HPF: NORMAL /HPF (ref 0–4)
SAO2 % BLDV: 90 %
SARS-COV-2 RNA RESP QL NAA+PROBE: NOT DETECTED
SODIUM SERPL-SCNC: 131 MMOL/L (ref 136–145)
SP GR UR STRIP.AUTO: 1.01 (ref 1–1.03)
TROPONIN, HIGH SENSITIVITY: 12 NG/L (ref 0–14)
TROPONIN, HIGH SENSITIVITY: 15 NG/L (ref 0–14)
TSH SERPL DL<=0.005 MIU/L-ACNC: 0.37 UIU/ML (ref 0.27–4.2)
UA COMPLETE W REFLEX CULTURE PNL UR: ABNORMAL
UA DIPSTICK W REFLEX MICRO PNL UR: YES
URN SPEC COLLECT METH UR: ABNORMAL
UROBILINOGEN UR STRIP-ACNC: 0.2 E.U./DL
WBC # BLD AUTO: 10.7 K/UL (ref 4–11)
WBC #/AREA URNS HPF: NORMAL /HPF (ref 0–5)

## 2025-04-18 PROCEDURE — 82803 BLOOD GASES ANY COMBINATION: CPT

## 2025-04-18 PROCEDURE — 96367 TX/PROPH/DG ADDL SEQ IV INF: CPT

## 2025-04-18 PROCEDURE — 83880 ASSAY OF NATRIURETIC PEPTIDE: CPT

## 2025-04-18 PROCEDURE — 84443 ASSAY THYROID STIM HORMONE: CPT

## 2025-04-18 PROCEDURE — 85610 PROTHROMBIN TIME: CPT

## 2025-04-18 PROCEDURE — 96365 THER/PROPH/DIAG IV INF INIT: CPT

## 2025-04-18 PROCEDURE — 96366 THER/PROPH/DIAG IV INF ADDON: CPT

## 2025-04-18 PROCEDURE — 80048 BASIC METABOLIC PNL TOTAL CA: CPT

## 2025-04-18 PROCEDURE — 87040 BLOOD CULTURE FOR BACTERIA: CPT

## 2025-04-18 PROCEDURE — 6360000002 HC RX W HCPCS: Performed by: STUDENT IN AN ORGANIZED HEALTH CARE EDUCATION/TRAINING PROGRAM

## 2025-04-18 PROCEDURE — 1200000000 HC SEMI PRIVATE

## 2025-04-18 PROCEDURE — 71260 CT THORAX DX C+: CPT

## 2025-04-18 PROCEDURE — 2580000003 HC RX 258: Performed by: STUDENT IN AN ORGANIZED HEALTH CARE EDUCATION/TRAINING PROGRAM

## 2025-04-18 PROCEDURE — 93005 ELECTROCARDIOGRAM TRACING: CPT | Performed by: STUDENT IN AN ORGANIZED HEALTH CARE EDUCATION/TRAINING PROGRAM

## 2025-04-18 PROCEDURE — 74177 CT ABD & PELVIS W/CONTRAST: CPT

## 2025-04-18 PROCEDURE — 99285 EMERGENCY DEPT VISIT HI MDM: CPT

## 2025-04-18 PROCEDURE — 87641 MR-STAPH DNA AMP PROBE: CPT

## 2025-04-18 PROCEDURE — 70450 CT HEAD/BRAIN W/O DYE: CPT

## 2025-04-18 PROCEDURE — 71045 X-RAY EXAM CHEST 1 VIEW: CPT

## 2025-04-18 PROCEDURE — 85025 COMPLETE CBC W/AUTO DIFF WBC: CPT

## 2025-04-18 PROCEDURE — 6360000004 HC RX CONTRAST MEDICATION: Performed by: STUDENT IN AN ORGANIZED HEALTH CARE EDUCATION/TRAINING PROGRAM

## 2025-04-18 PROCEDURE — 83605 ASSAY OF LACTIC ACID: CPT

## 2025-04-18 PROCEDURE — 6370000000 HC RX 637 (ALT 250 FOR IP): Performed by: STUDENT IN AN ORGANIZED HEALTH CARE EDUCATION/TRAINING PROGRAM

## 2025-04-18 PROCEDURE — 36415 COLL VENOUS BLD VENIPUNCTURE: CPT

## 2025-04-18 PROCEDURE — 83690 ASSAY OF LIPASE: CPT

## 2025-04-18 PROCEDURE — 84484 ASSAY OF TROPONIN QUANT: CPT

## 2025-04-18 PROCEDURE — 87636 SARSCOV2 & INF A&B AMP PRB: CPT

## 2025-04-18 PROCEDURE — 81001 URINALYSIS AUTO W/SCOPE: CPT

## 2025-04-18 PROCEDURE — 80076 HEPATIC FUNCTION PANEL: CPT

## 2025-04-18 PROCEDURE — 96375 TX/PRO/DX INJ NEW DRUG ADDON: CPT

## 2025-04-18 PROCEDURE — 2500000003 HC RX 250 WO HCPCS: Performed by: STUDENT IN AN ORGANIZED HEALTH CARE EDUCATION/TRAINING PROGRAM

## 2025-04-18 RX ORDER — ACETAMINOPHEN 650 MG/1
650 SUPPOSITORY RECTAL ONCE
Status: COMPLETED | OUTPATIENT
Start: 2025-04-18 | End: 2025-04-18

## 2025-04-18 RX ORDER — ONDANSETRON 4 MG/1
4 TABLET, ORALLY DISINTEGRATING ORAL EVERY 8 HOURS PRN
Status: DISCONTINUED | OUTPATIENT
Start: 2025-04-18 | End: 2025-04-21 | Stop reason: HOSPADM

## 2025-04-18 RX ORDER — MODAFINIL 100 MG/1
100 TABLET ORAL EVERY MORNING
Status: DISCONTINUED | OUTPATIENT
Start: 2025-04-19 | End: 2025-04-21 | Stop reason: HOSPADM

## 2025-04-18 RX ORDER — ATORVASTATIN CALCIUM 40 MG/1
40 TABLET, FILM COATED ORAL DAILY
Status: DISCONTINUED | OUTPATIENT
Start: 2025-04-18 | End: 2025-04-21

## 2025-04-18 RX ORDER — SODIUM CHLORIDE, SODIUM LACTATE, POTASSIUM CHLORIDE, AND CALCIUM CHLORIDE .6; .31; .03; .02 G/100ML; G/100ML; G/100ML; G/100ML
30 INJECTION, SOLUTION INTRAVENOUS ONCE
Status: COMPLETED | OUTPATIENT
Start: 2025-04-18 | End: 2025-04-18

## 2025-04-18 RX ORDER — POLYETHYLENE GLYCOL 3350 17 G/17G
17 POWDER, FOR SOLUTION ORAL DAILY PRN
Status: DISCONTINUED | OUTPATIENT
Start: 2025-04-18 | End: 2025-04-21 | Stop reason: HOSPADM

## 2025-04-18 RX ORDER — VITAMIN B COMPLEX
2000 TABLET ORAL DAILY
Status: DISCONTINUED | OUTPATIENT
Start: 2025-04-18 | End: 2025-04-21 | Stop reason: HOSPADM

## 2025-04-18 RX ORDER — SODIUM CHLORIDE 9 MG/ML
INJECTION, SOLUTION INTRAVENOUS PRN
Status: DISCONTINUED | OUTPATIENT
Start: 2025-04-18 | End: 2025-04-21 | Stop reason: HOSPADM

## 2025-04-18 RX ORDER — 0.9 % SODIUM CHLORIDE 0.9 %
1000 INTRAVENOUS SOLUTION INTRAVENOUS ONCE
Status: COMPLETED | OUTPATIENT
Start: 2025-04-18 | End: 2025-04-18

## 2025-04-18 RX ORDER — DONEPEZIL HYDROCHLORIDE 10 MG/1
10 TABLET, FILM COATED ORAL NIGHTLY
Status: DISCONTINUED | OUTPATIENT
Start: 2025-04-18 | End: 2025-04-21 | Stop reason: HOSPADM

## 2025-04-18 RX ORDER — ONDANSETRON 2 MG/ML
4 INJECTION INTRAMUSCULAR; INTRAVENOUS EVERY 6 HOURS PRN
Status: DISCONTINUED | OUTPATIENT
Start: 2025-04-18 | End: 2025-04-21 | Stop reason: HOSPADM

## 2025-04-18 RX ORDER — ENOXAPARIN SODIUM 100 MG/ML
40 INJECTION SUBCUTANEOUS NIGHTLY
Status: DISCONTINUED | OUTPATIENT
Start: 2025-04-18 | End: 2025-04-21 | Stop reason: HOSPADM

## 2025-04-18 RX ORDER — SODIUM CHLORIDE 0.9 % (FLUSH) 0.9 %
5-40 SYRINGE (ML) INJECTION PRN
Status: DISCONTINUED | OUTPATIENT
Start: 2025-04-18 | End: 2025-04-21 | Stop reason: HOSPADM

## 2025-04-18 RX ORDER — SODIUM CHLORIDE 0.9 % (FLUSH) 0.9 %
5-40 SYRINGE (ML) INJECTION EVERY 12 HOURS SCHEDULED
Status: DISCONTINUED | OUTPATIENT
Start: 2025-04-18 | End: 2025-04-21 | Stop reason: HOSPADM

## 2025-04-18 RX ORDER — IOPAMIDOL 755 MG/ML
75 INJECTION, SOLUTION INTRAVASCULAR
Status: COMPLETED | OUTPATIENT
Start: 2025-04-18 | End: 2025-04-18

## 2025-04-18 RX ORDER — ACETAMINOPHEN 500 MG
1000 TABLET ORAL
Status: DISCONTINUED | OUTPATIENT
Start: 2025-04-18 | End: 2025-04-18

## 2025-04-18 RX ORDER — ACETAMINOPHEN 325 MG/1
650 TABLET ORAL EVERY 6 HOURS PRN
Status: DISCONTINUED | OUTPATIENT
Start: 2025-04-19 | End: 2025-04-21 | Stop reason: HOSPADM

## 2025-04-18 RX ORDER — SODIUM CHLORIDE 9 MG/ML
INJECTION, SOLUTION INTRAVENOUS CONTINUOUS
Status: ACTIVE | OUTPATIENT
Start: 2025-04-18 | End: 2025-04-19

## 2025-04-18 RX ORDER — ACETAMINOPHEN 650 MG/1
650 SUPPOSITORY RECTAL EVERY 6 HOURS PRN
Status: DISCONTINUED | OUTPATIENT
Start: 2025-04-19 | End: 2025-04-21 | Stop reason: HOSPADM

## 2025-04-18 RX ADMIN — ACETAMINOPHEN 650 MG: 650 SUPPOSITORY RECTAL at 17:58

## 2025-04-18 RX ADMIN — AZITHROMYCIN MONOHYDRATE 500 MG: 500 INJECTION, POWDER, LYOPHILIZED, FOR SOLUTION INTRAVENOUS at 18:16

## 2025-04-18 RX ADMIN — SODIUM CHLORIDE: 0.9 INJECTION, SOLUTION INTRAVENOUS at 20:38

## 2025-04-18 RX ADMIN — VANCOMYCIN HYDROCHLORIDE 1750 MG: 10 INJECTION, POWDER, LYOPHILIZED, FOR SOLUTION INTRAVENOUS at 15:49

## 2025-04-18 RX ADMIN — IOPAMIDOL 75 ML: 755 INJECTION, SOLUTION INTRAVENOUS at 17:17

## 2025-04-18 RX ADMIN — SODIUM CHLORIDE, SODIUM LACTATE, POTASSIUM CHLORIDE, AND CALCIUM CHLORIDE 1800 ML: .6; .31; .03; .02 INJECTION, SOLUTION INTRAVENOUS at 14:13

## 2025-04-18 RX ADMIN — SODIUM CHLORIDE 1000 ML: 0.9 INJECTION, SOLUTION INTRAVENOUS at 18:11

## 2025-04-18 RX ADMIN — PIPERACILLIN AND TAZOBACTAM 4500 MG: 4; .5 INJECTION, POWDER, LYOPHILIZED, FOR SOLUTION INTRAVENOUS at 14:44

## 2025-04-18 RX ADMIN — ENOXAPARIN SODIUM 40 MG: 40 INJECTION SUBCUTANEOUS at 22:59

## 2025-04-18 RX ADMIN — SODIUM CHLORIDE, PRESERVATIVE FREE 10 ML: 5 INJECTION INTRAVENOUS at 22:59

## 2025-04-18 ASSESSMENT — PAIN - FUNCTIONAL ASSESSMENT: PAIN_FUNCTIONAL_ASSESSMENT: NONE - DENIES PAIN

## 2025-04-18 ASSESSMENT — LIFESTYLE VARIABLES: HOW OFTEN DO YOU HAVE A DRINK CONTAINING ALCOHOL: NEVER

## 2025-04-18 NOTE — ED PROVIDER NOTES
THE [facility]  EMERGENCY DEPARTMENT ENCOUNTER          ATTENDING PHYSICIAN NOTE       Date of evaluation: 4/18/2025    ADDENDUM:      Care of this patient was assumed from Dr. Hodgson.  The patient was seen for Altered Mental Status and Fever (Pt presents from nursing facility ([place]) for AMS, and fever. EMS states Pt was tachycardic upon arrival. )  .  The patient's initial evaluation and plan have been discussed with the prior provider who initially evaluated the patient. Please see the original HPI and MDM for full details. Nursing Notes, Past Medical Hx, Past Surgical Hx, Social Hx, Allergies, and Family Hx were all reviewed.    Diagnostic Results     EKG   EKG Interpretation     Interpreted by me (emergency department physician)     Rhythm:  sinus tachycardia   Rate: 151  Axis: normal  Ectopy: none  Conduction: prolonged Qtc 513  ST Segments: rate related changes diffusely, no elevation/epdression meeting criteria  T Waves: no acute change  Q Waves: nonspecific pattern     Clinical Impression:   Sinus tachycardia  This is a non-specific EKG with no significant change compared to the prior EKG dated 3/2/25.  There is new QTc prolongation   interval prolongation.    There is no evidence of acute ischemia.   DMITRI HODGSON    RADIOLOGY:  CT CHEST ABDOMEN PELVIS W CONTRAST Additional Contrast? None   Final Result      CHEST:      1. Dilated and fluid-filled esophagus.   2. Consolidation in the left lung, likely pneumonia.            ABDOMEN/PELVIS:      1. No acute intra-abdominopelvic abnormality.      Electronically signed by Nando Raya, DO      CT HEAD WO CONTRAST   Final Result      Age related changes in the brain. No acute abnormality seen.            Electronically signed by Mando Irwin      XR CHEST PORTABLE   Final Result   Left basilar atelectasis.      Electronically signed by Joel Way          LABS:   Results for orders placed or performed during the hospital encounter of

## 2025-04-18 NOTE — H&P
[facility] Medicine History & Physical    V 1.6    Date of Admission: 4/18/2025    Date of Service:  Pt seen/examined on 4/18/25     [x]Admitted to Inpatient with expected LOS greater than two midnights due to medical therapy.  []Placed in Observation status.    Chief Admission Complaint: Fever and altered mental status from facility    Presenting Admission History:      74 y.o. female who presented to [facility] with  PMHx significant for Alzheimer's dementia, multiple sclerosis, and hyperlipidemia who presents from her nursing facility for fever and altered mental status.  Patient was febrile to 103 in our emergency room.  COVID and flu have been negative.  Patient did have a CT scan that showed a left sided consolidation consistent with pneumonia.  Patient on room air in the emergency room.  On my evaluation she is pleasantly confused and in no acute distress.  Patient is hemodynamically stable though tachycardic.  She was given Tylenol for fever.      Assessment/Plan:      Current Principal Problem:  HCAP (healthcare-associated pneumonia)    Hospital associated pneumonia  Patient with consolidation on chest CT as well as recent hospitalization.  Febrile.  COVID and flu negative.  IV antibiotics: Vancomycin plus cefepime  Follow-up MRSA nasal, blood cultures, strep/Legionella testing    Mild hyponatremia  Sodium 131 on presentation.  IV normal saline  Monitor BMP    Alzheimer's dementia  Continue home medication  SLP evaluation for diet    Hyperlipidemia  Continue statin    Discussed management and the need for Hospitalization of the patient w/ the Emergency Department Provider: Dr. Hodgson    Chest CT: I have reviewed the chest CT with the following interpretation: Left-sided consolidation consistent with pneumonia  EKG:  I have reviewed the EKG with the following interpretation: Sinus tachycardia    Physical Exam Performed:      BP (!) 163/86   Pulse (!) 135   Temp (!) 101.7 °F (38.7 °C)   Resp

## 2025-04-18 NOTE — ED NOTES
Patient Name: Ivonne Garcia  : 1950 74 y.o.  MRN: [id number]  ED Room #: A07/A07-07     Chief complaint:   Chief Complaint   Patient presents with    Altered Mental Status    Fever     Pt presents from nursing facility ([facility]) for AMS, and fever. EMS states Pt was tachycardic upon arrival.      Hospital Problem/Diagnosis:   Hospital Problems           Last Modified POA    * (Principal) HCAP (healthcare-associated pneumonia) 2025 Yes         O2 Flow Rate:O2 Device: None (Room air)   (if applicable)  Cardiac Rhythm:   (if applicable)  Active LDA's:   Peripheral IV 25 Right;Dorsal Wrist (Active)       Peripheral IV 25 Left Antecubital (Active)            How does patient ambulate? Unknown, did not assess in the Emergency Department    2. How does patient take pills?  Assessed swallow, patient coughed with sip of water. Gave rectal tylenol.    3. Is patient alert? Alert    4. Is patient oriented? To Person, To Place, To Time, Follows Commands, and Confused - easily redirected but needed frequently.    5.   Patient arrived from:  needs clarification with family. EMS states she was brought from a facility where her mom is a patient, but that she is not a patient there. However, last admission shows documentation that she was discharged to some assisted living facility.   Facility Name: ___________________________________________    6. If patient is disoriented or from a Skill Nursing Facility has family been notified of admission? Yes    7. Patient belongings? Belongings: clothes went with family, shoes are with patient    Disposition of belongings? Sent with Family     8. Any specific patient or family belongings/needs/dynamics?   a. As above, need clarification on living situation/home care    9. Miscellaneous comments/pending orders?  a. Patient did pull out one of her IVs. Redirected her and put coban on the others and she has not done so again. Generally pleasantly confused.

## 2025-04-18 NOTE — CONSULTS
Clinical Pharmacy Consult Note    ED Encounter Date: 4/18/2025     Patient presents to the ED with complaints of AMS and fever from independent living at [facility], where patient reportedly lives with her mother. Patient was found to be tachycardic, febrile upon arrival to the ED.     Pharmacy is consulted to dose Vancomycin x1 dose in ED per Dr. Hodgson.    Ht: 156.2 cm  Wt: 65.3 kg    Estimated Creatinine Clearance: 56 mL/min (based on SCr of 0.8 mg/dL).    Assessment/Plan:  Will order Vancomycin 1750 mg (~25 mg/kg) IV x1 for administration in ED per ER pharmacy consult.    If Vancomycin is to continue on admission and pharmacy is to manage dosing, please re-consult with admission orders.    Thanks for consulting pharmacy!    Kaley Ruth, PharmD, BCCCP  Clinical Pharmacy Specialist - Emergency Dept  Wireless: [phone]  4/18/2025 3:32 PM

## 2025-04-18 NOTE — ED PROVIDER NOTES
THE [facility]  EMERGENCY DEPARTMENT ENCOUNTER          ATTENDING PHYSICIAN NOTE       Date of evaluation: 4/18/2025    Chief Complaint     Altered Mental Status and Fever (Pt presents from nursing facility ([place]) for AMS, and fever. EMS states Pt was tachycardic upon arrival. )      History of Present Illness     Ivonne Garcia is a 74 y.o. female who presents with altered mental status and fever    Patient states she has no complaints.  She denies headache or neck pain.  She denies chest pain or abdominal pain.  No change in her urinary habits per her report.    She does have a fever.  EMS reported concern nursing facility for altered mental  status but when I was able to discuss with family this is basically just that she was more tired appearing and made a couple of errors with the activities of daily living that represented being a little bit off but no specific abnormalities and not grossly confused or baseline Alzheimer's    PMHx: Alzheimer's, recetn hospitlaization in March for flu, as below  SH: as below      ASSESSMENT / PLAN  (MEDICAL DECISION MAKING)     INITIAL VITALS: BP: (!) 145/79, Temp: (!) 103.1 °F (39.5 °C), Pulse: (!) 152, Respirations: 16, SpO2: 94 %      Ivonne Garcia is a 74 y.o. female with fever.    Noted to be tachycardic.   No meningismus on exam.     This patient proceeded with altered mental status.  Immediate evaluation notable for:   -[place] prehospital stroke scale was negative  -FSBG was within normal limits    Evaluation considered multiple possible etiologies:  Infectious:   -Patient was  without signs on exam or specific referent symptoms to suggestion specific referent infection although she did have a fever and tachycardai  -I had a low suspicion for meningitis or encephalitis given the patient was not complaining of neck pain, or demonstrating suggestive signs on exam  -Urinalysis   ordered and pending -straight cath ordered to evalaute for

## 2025-04-19 ENCOUNTER — APPOINTMENT (OUTPATIENT)
Dept: GENERAL RADIOLOGY | Age: 75
DRG: 871 | End: 2025-04-19
Payer: MEDICARE

## 2025-04-19 LAB
ANION GAP SERPL CALCULATED.3IONS-SCNC: 10 MMOL/L (ref 3–16)
BASOPHILS # BLD: 0 K/UL (ref 0–0.2)
BASOPHILS NFR BLD: 0.3 %
BUN SERPL-MCNC: 10 MG/DL (ref 7–20)
CALCIUM SERPL-MCNC: 8.7 MG/DL (ref 8.3–10.6)
CHLORIDE SERPL-SCNC: 108 MMOL/L (ref 99–110)
CO2 SERPL-SCNC: 23 MMOL/L (ref 21–32)
CREAT SERPL-MCNC: 0.7 MG/DL (ref 0.6–1.2)
DEPRECATED RDW RBC AUTO: 14.1 % (ref 12.4–15.4)
EOSINOPHIL # BLD: 0 K/UL (ref 0–0.6)
EOSINOPHIL NFR BLD: 0.1 %
GFR SERPLBLD CREATININE-BSD FMLA CKD-EPI: >90 ML/MIN/{1.73_M2}
GLUCOSE SERPL-MCNC: 94 MG/DL (ref 70–99)
HCT VFR BLD AUTO: 41.7 % (ref 36–48)
HGB BLD-MCNC: 14.1 G/DL (ref 12–16)
LYMPHOCYTES # BLD: 1.9 K/UL (ref 1–5.1)
LYMPHOCYTES NFR BLD: 18.9 %
MAGNESIUM SERPL-MCNC: 1.85 MG/DL (ref 1.8–2.4)
MCH RBC QN AUTO: 30.3 PG (ref 26–34)
MCHC RBC AUTO-ENTMCNC: 33.8 G/DL (ref 31–36)
MCV RBC AUTO: 89.7 FL (ref 80–100)
MONOCYTES # BLD: 0.6 K/UL (ref 0–1.3)
MONOCYTES NFR BLD: 6 %
MRSA DNA SPEC QL NAA+PROBE: NORMAL
NEUTROPHILS # BLD: 7.4 K/UL (ref 1.7–7.7)
NEUTROPHILS NFR BLD: 74.7 %
PLATELET # BLD AUTO: 202 K/UL (ref 135–450)
PMV BLD AUTO: 8.5 FL (ref 5–10.5)
POTASSIUM SERPL-SCNC: 3.5 MMOL/L (ref 3.5–5.1)
RBC # BLD AUTO: 4.65 M/UL (ref 4–5.2)
SODIUM SERPL-SCNC: 141 MMOL/L (ref 136–145)
WBC # BLD AUTO: 9.9 K/UL (ref 4–11)

## 2025-04-19 PROCEDURE — 2500000003 HC RX 250 WO HCPCS: Performed by: STUDENT IN AN ORGANIZED HEALTH CARE EDUCATION/TRAINING PROGRAM

## 2025-04-19 PROCEDURE — 92526 ORAL FUNCTION THERAPY: CPT

## 2025-04-19 PROCEDURE — 85025 COMPLETE CBC W/AUTO DIFF WBC: CPT

## 2025-04-19 PROCEDURE — 36415 COLL VENOUS BLD VENIPUNCTURE: CPT

## 2025-04-19 PROCEDURE — 87449 NOS EACH ORGANISM AG IA: CPT

## 2025-04-19 PROCEDURE — 6360000002 HC RX W HCPCS: Performed by: STUDENT IN AN ORGANIZED HEALTH CARE EDUCATION/TRAINING PROGRAM

## 2025-04-19 PROCEDURE — 1200000000 HC SEMI PRIVATE

## 2025-04-19 PROCEDURE — 74230 X-RAY XM SWLNG FUNCJ C+: CPT

## 2025-04-19 PROCEDURE — 80048 BASIC METABOLIC PNL TOTAL CA: CPT

## 2025-04-19 PROCEDURE — 92610 EVALUATE SWALLOWING FUNCTION: CPT

## 2025-04-19 PROCEDURE — 6370000000 HC RX 637 (ALT 250 FOR IP): Performed by: STUDENT IN AN ORGANIZED HEALTH CARE EDUCATION/TRAINING PROGRAM

## 2025-04-19 PROCEDURE — 2580000003 HC RX 258: Performed by: STUDENT IN AN ORGANIZED HEALTH CARE EDUCATION/TRAINING PROGRAM

## 2025-04-19 PROCEDURE — 92611 MOTION FLUOROSCOPY/SWALLOW: CPT

## 2025-04-19 PROCEDURE — 83735 ASSAY OF MAGNESIUM: CPT

## 2025-04-19 RX ADMIN — SODIUM CHLORIDE 1500 MG: 0.9 INJECTION, SOLUTION INTRAVENOUS at 17:46

## 2025-04-19 RX ADMIN — ENOXAPARIN SODIUM 40 MG: 40 INJECTION SUBCUTANEOUS at 20:10

## 2025-04-19 RX ADMIN — CEFEPIME 2000 MG: 2 INJECTION, POWDER, FOR SOLUTION INTRAVENOUS at 20:10

## 2025-04-19 RX ADMIN — ATORVASTATIN CALCIUM 40 MG: 40 TABLET, FILM COATED ORAL at 08:25

## 2025-04-19 RX ADMIN — SODIUM CHLORIDE, PRESERVATIVE FREE 10 ML: 5 INJECTION INTRAVENOUS at 08:26

## 2025-04-19 RX ADMIN — DONEPEZIL HYDROCHLORIDE 10 MG: 10 TABLET ORAL at 20:10

## 2025-04-19 RX ADMIN — SODIUM CHLORIDE: 0.9 INJECTION, SOLUTION INTRAVENOUS at 12:52

## 2025-04-19 RX ADMIN — Medication 2000 UNITS: at 08:25

## 2025-04-19 RX ADMIN — CEFEPIME 2000 MG: 2 INJECTION, POWDER, FOR SOLUTION INTRAVENOUS at 12:53

## 2025-04-19 RX ADMIN — SODIUM CHLORIDE: 0.9 INJECTION, SOLUTION INTRAVENOUS at 17:43

## 2025-04-19 NOTE — PROGRESS NOTES
4 Eyes Admission Assessment     I agree as the admission nurse that 2 RN's have performed a thorough Head to Toe Skin Assessment on the patient. ALL assessment sites listed below have been assessed on admission.       Areas assessed by both nurses: Tracie Cespedes and Jamel Zhang, RNs  [x]   Head, Face, and Ears   [x]   Shoulders, Back, and Chest  [x]   Arms, Elbows, and Hands   [x]   Coccyx, Sacrum, and Ischium  [x]   Legs, Feet, and Heels        Does the Patient have Skin Breakdown?  No         Cesar Prevention initiated:  Yes   Wound Care Orders initiated:  NA      [facility] nurse consulted for Pressure Injury (Stage 3,4, Unstageable, DTI, NWPT, and Complex wounds) or Cesar score 18 or lower:  NA      Nurse 1 eSignature: Electronically signed by Tracie Cespedes RN on 4/19/25 at 5:04 AM EDT    **SHARE this note so that the co-signing nurse is able to place an eSignature**    Nurse 2 eSignature: {Esignature:[id number]}

## 2025-04-19 NOTE — PLAN OF CARE
Problem: Safety - Adult  Goal: Free from fall injury  Outcome: Progressing  Note:  Remains free from falls, bed in low position, call light in reach, bed alarm monitoring for safety.     Problem: Pain  Goal: Verbalizes/displays adequate comfort level or baseline comfort level  Outcome: Progressing  Note:  Denies pain/needs, will continue to monitor.     Problem: Respiratory - Adult  Goal: Achieves optimal ventilation and oxygenation  Outcome: Progressing  Note:  O2 95 to 97% on Room Air.     Problem: Cardiovascular - Adult  Goal: Absence of cardiac dysrhythmias or at baseline  Outcome: Progressing  Note:  Normal Sinus Rhythm rate 100 at 2201.

## 2025-04-19 NOTE — PLAN OF CARE
Patient notified of results below. Patient verbalizes understanding and has no further questions.     Facility will fax results when back. Urine specimen sent out yesterday.    TRISH Painter is wondering if patient can have order for hemorrhoid cream and suppositories as the patient is having pain due to them externally and internally suspected. RN states she will discuss with provider and return call if necessary. Otherwise will send orders to P.   Pt alert and oriented x3. Pt was able to tolerate morning med. Pt voices no complaints at this moment. Free from fall. Standard precaution in place. Plan of care ongoing.  Problem: Respiratory - Adult  Goal: Achieves optimal ventilation and oxygenation  4/19/2025 1534 by Yohana Seay RN  Outcome: Progressing     Problem: Cardiovascular - Adult  Goal: Absence of cardiac dysrhythmias or at baseline  4/19/2025 1534 by Yohana Seay RN  Outcome: Progressing     Problem: Safety - Adult  Goal: Free from fall injury  4/19/2025 1534 by Yohana Seay RN  Outcome: Progressing     Problem: Pain  Goal: Verbalizes/displays adequate comfort level or baseline comfort level  4/19/2025 1534 by Yohana Seay RN  Outcome: Progressing       Problem: Skin/Tissue Integrity  Goal: Skin integrity remains intact  Description: 1.  Monitor for areas of redness and/or skin breakdown2.  Assess vascular access sites hourly3.  Every 4-6 hours minimum:  Change oxygen saturation probe site4.  Every 4-6 hours:  If on nasal continuous positive airway pressure, respiratory therapy assess nares and determine need for appliance change or resting period  Outcome: Progressing     Problem: SLP Adult - Impaired Swallowing  Goal: By Discharge: Advance to least restrictive diet without signs or symptoms of aspiration for planned discharge setting.  See evaluation for individualized goals.  4/19/2025 1022 by Flory Zhang, SLP  Outcome: Progressing

## 2025-04-19 NOTE — PROCEDURES
INSTRUMENTAL SWALLOW REPORT  MODIFIED BARIUM SWALLOW    NAME: Ivonne Garcia     : 1950  MRN: [id number]       Date of Eval: 2025     Ordering Physician: Joel Casillas MD  Referring Diagnosis: Dysphagia    Past Medical History:  has a past medical history of Hyperlipidemia and MS (multiple sclerosis) (HCC).  Past Surgical History:  has no past surgical history on file.    Chest imaging:   CXR : Left basilar atelectasis.  CT Chest : Consolidation in the left lung, likely pneumonia.     Prior MBS Results: N/A    Patient Complaints/Reason for Referral:  Ivonne Garcia was referred for a MBS to assess the efficiency of his/her swallow function, assess for aspiration, and to make recommendations regarding safe dietary consistencies, effective compensatory strategies, and safe eating environment.    Onset of problem: 2025    Behavior/Cognition: WFL  Vision/Hearing: WFL    Impressions:  Oral Phase  Complete labial closure without anterior loss. Prolonged mastication with complete re-collection of solid bolus. Prolonged oral bolus holding, reduced lingual control, and delayed anterior to posterior motion allowed for <50% loss of boluses posteriorly, reaching the pyriforms before initiating a swallow. Complete lingual to palatal seal with adequate oral clearance.  Pharyngeal Phase  Complete soft palate elevation to the posterior pharyngeal wall. Complete pharyngeal wall stripping and base of tongue retraction that allowed for adequate pharyngeal clearance  Complete anterior hyoid motion, superior elevation of the thyroid cartilage, and epiglottic inversion visualized at the height of the swallow.   Upper Esophageal Screen  Complete PES distention without obstruction of bolus flow into the esophagus  AP view not completed    Treatment Dx and ICD 10: dysphagia  Position: Lateral and upright  Consistencies administered: thin liquid via tsp/cup/straw ; mildly thick via tsp/cup ; puree ;

## 2025-04-19 NOTE — PROGRESS NOTES
Speech Language Pathology  Facility/Department:[address]  Dysphagia Evaluation and Treatment    Name: Ivonne Garcia  : 1950  MRN: [id number]                                                     Patient Diagnosis(es):   Patient Active Problem List    Diagnosis Date Noted    HCAP (healthcare-associated pneumonia) 2025    Fall (on) (from) other stairs and steps, initial encounter 2025    Dyslipidemia 2018    Recurrent major depressive disorder 2018    Osteoporosis 2017    Early onset Alzheimer's dementia without behavioral disturbance (HCC) 2017    Chronic fatigue syndrome 2017    Hypercalcemia 2017    Vitamin D deficiency 2017    CNS demyelinating disease (HCC) 2016    Dysarthria 2016    Poor memory 2016    Urge incontinence of urine 2016    Dysphagia 2016    Constipation 2016    Pneumothorax on right 2014    Rib fracture 2014    Acute pharyngitis 2013    Multiple sclerosis (HCC) 2013    Ataxia 2013    Gait disorder 2013    Incontinence 2013       Past Medical History:   Diagnosis Date    Hyperlipidemia     MS (multiple sclerosis) (HCC)      No past surgical history on file.    Reason for Referral:  Ivonne Garcia  was referred for a Speech Therapy evaluation to assess swallow function and/or communication.    History of Present Illness  Per MD notes H&P : \"74 y.o. female who presented to [facility] with  PMHx significant for Alzheimer's dementia, multiple sclerosis, and hyperlipidemia who presents from her nursing facility for fever and altered mental status.  Patient was febrile to 103 in our emergency room.  COVID and flu have been negative.  Patient did have a CT scan that showed a left sided consolidation consistent with pneumonia.  Patient on room air in the emergency room.  On my evaluation she is pleasantly confused and in no acute  targets for treatment.      Prognosis:  Prognosis for improvement: Good  Barriers to reach goals: N/A    Treatment:  Dysphagia Goals:  The pt will be seen  1-3 x to address the following goals:     Goal 1: Patient will participate in instrumental assessment of swallow function as appropriate.      Education  Provided education to patient re: role of SLP, purpose of visit, and recommendations. SLP further provides education re: MBSS rationale, procedure, and possible recommendations. Patient and family with good comprehension.    Pt goal: to feel better  Pt discharge goal: to go home    Total treatment time: 15 min dysphagia eval, 10 min dysphagia tx    Plan:   Continue per POC  Recommended Diet: Upgrade to Regular/Thin  Medication administration: Whole with thin    Strategies:   Universal aspiration precautions (small bites/sips, upright position, slow rate)    Discharge Recommendation: TBD pending MBSS  Discussed with Yohana RODRIGUEZ  Needs met prior to leaving room, call light within reach    Flory Zhang MS, CCC-SLP  [id number]  Speech-Language Pathologist  [facility]     This document will serve as a dc summary if pt dc prior to next visit

## 2025-04-19 NOTE — PROGRESS NOTES
Patient declined admission questions due to request to sleep and answer in the daytime during normal hours when awake.

## 2025-04-19 NOTE — CONSULTS
[facility] -  Clinical Pharmacy Note    Vancomycin - Management by Pharmacy    Consult Date(s): 04/18/2025  Consulting Provider(s): Brad Kennedy MD    Assessment / Plan  Pneumonia (Nosocomial) - Vancomycin  Concurrent Antimicrobials: Cefepime (starting on 04/19/2025)  Day of Vanc Therapy / Ordered Duration: Day 1 of 7  Current Dosing Method: Bayesian-Guided AUC Dosing  Therapeutic Goal: -600 mg/L*hr  Current Dose / Plan:   Loading dose of Vancomycin 1,750 mg IV given once  Stable renal function: Scr 0.8 mg/dL with calculated CrCl of 56 mL/min  Will order maintenance dose of 1,500 mg IV Q24H  AUC24,ss = 539 mg/L*hr, Ctrough,ss = 15 mg/L  Random Level will be ordered as clinically indicated.  Will continue to monitor clinical condition and make adjustments to regimen as appropriate.    Thank you for consulting pharmacy,    Sharif Browne (Iris)  Pharmacy Intern, PharmD Candidate 2026       Interval update:  therapy initiation     Subjective/Objective:   Ivonne Garcia is a 74 y.o. female with a PMHx significant for Alzheimer's dementia, multiple sclerosis, CNS demyelinating disease, Vitamin D deficiency, Osteoporosis, and hyperlipidemia who presented at the ED from her nursing facility ([facility]) is admitted with fever, and altered mental status.     Pharmacy is consulted to dose Vancomycin.    Ht Readings from Last 1 Encounters:   03/02/25 1.562 m (5' 1.5\")     Wt Readings from Last 1 Encounters:   04/18/25 65.3 kg (144 lb)     Current & Prior Antimicrobial Regimen(s):  Cefepime 2,000 mg IV Q12H to be administered from 04/19/2025 - 04/26/2025 (14 total doses)  Vancomycin (04/18/2025 - current)  Loading 1,750 mg IV x once   Maintenance 1,500 mg IV Q24H    Vancomycin Level(s) / Doses:    Date Time Dose Type of Level / Level Interpretation                 Note: Serum levels collected for AUC-based dosing may be high if collected in close proximity to the dose

## 2025-04-19 NOTE — PROGRESS NOTES
Admission: Patient received to room 6318 from ED. Patient admitted with Dx of Healthcare Acquired Pneumonia. Patient A&Ox2-3 (intermittent confusion/forgetfullness) upon arrival. Tele monitor applied, rate and rhythm verified with monitor reader. Admission assessment as charted. VSS. Patient oriented to room, staff, and call system. Educated on fall protocol and hourly rounding. Patient informed to utilize call light with any needs. Pt verbalized understanding. Will continue to monitor.

## 2025-04-19 NOTE — PROGRESS NOTES
[facility]  Clinical Pharmacy Note    Vancomycin - Management by Pharmacy    Consult Date(s): 04/18/2025  Consulting Provider(s): Brad Kennedy MD    Assessment / Plan  Pneumonia (Nosocomial) - Vancomycin  Concurrent Antimicrobials: Cefepime day 1  Day of Vanc Therapy / Ordered Duration: Day 2 of 7  Current Dosing Method: Bayesian-Guided AUC Dosing  Therapeutic Goal: -600 mg/L*hr  Current Dose / Plan:   Renal function appears to be at baseline (SCr = 0.7 today)  Appears to be making adequate UOP, documented as ~2.8 mL/kg/hr overnight  Received 1750 mg IV x1 with plans for 1500 mg IV Q24h to follow  Predicted AUC = 483 mg/L*hr ; Trough = 12.9 mg/L  Will plan to continue current regimen and obtain random level 4/20 AM to assess kinetics  Will continue to monitor clinical condition and make adjustments to regimen as appropriate.    [facility] Clinical Pharmacy Note - Renal Dosing    Cefepime ordered for treatment of CAP. Per [facility] Renal Dose Adjustment Policy, 2g Q12h will be changed to 2000 mg IV EI Q18h.     Estimated Creatinine Clearance: Estimated Creatinine Clearance: 64 mL/min (based on SCr of 0.7 mg/dL).  Dialysis Status, STELLA, CKD: improved CrCl  BMI: Body mass index is 26.85 kg/m².    Rationale for Adjustment: Agent is renally eliminated.    Pharmacy will continue to monitor renal function, cultures and sensitivities (where available) and adjust dose as necessary.      Thank you for consulting pharmacy,    Please call with any questions,  Elysia Worrell, PharmD  PGY1 Pharmacy Resident  Wireless: [phone]  4/19/2025 10:20 AM        Interval update:  afebrile since last night (24h Tmax 103.1F - on presentation), negative leukocytosis, cultures pending    Subjective/Objective:   Ivonne Garcia is a 74 y.o. female with a PMHx significant for Alzheimer's dementia, multiple sclerosis, CNS demyelinating disease, Vitamin D deficiency, Osteoporosis, and hyperlipidemia who  presented at the ED from her nursing facility ([facility]) is admitted with fever, and altered mental status.     Pharmacy is consulted to dose Vancomycin.    Ht Readings from Last 1 Encounters:   03/02/25 1.562 m (5' 1.5\")     Wt Readings from Last 1 Encounters:   04/19/25 65.5 kg (144 lb 6.4 oz)     Current & Prior Antimicrobial Regimen(s):  Cefepime 2,000 mg IV Q8H (4/19-current)  Vancomycin (04/18/2025 - current)  1,750 mg IV x 1 (4/18)  1,500 mg IV Q24H (4/19-current)    Vancomycin Level(s) / Doses:    Date Time Dose Type of Level / Level Interpretation   4/20  1500 mg IV Q24h Random = ordered           Note: Serum levels collected for AUC-based dosing may be high if collected in close proximity to the dose administered. This is not necessarily indicative of toxicity.    Cultures & Sensitivities:    Date Site Micro Susceptibility / Result   04/18 Legionella, urine Ordered    04/18 Strep Pneumoniae, urine Ordered    04/18 MRSA, nares Sent     04/18 Blood 1 & 2 Sent     04/18 Covid-19 & Flu combo, nasopharyngeal swab Negative            Recent Labs     04/18/25  1352 04/19/25  0355   CREATININE 0.8 0.7   BUN 9 10   WBC 10.7 9.9       Estimated Creatinine Clearance: 64 mL/min (based on SCr of 0.7 mg/dL).    Additional Lab Values / Findings of Note:    No results for input(s): \"PROCAL\" in the last 72 hours.

## 2025-04-19 NOTE — PROGRESS NOTES
Hospitalist Progress Note      Name:  Ivonne Garcia /Age/Sex: 1950  (74 y.o. female)   MRN & CSN:  [id number] & [id number] Encounter Date/Time: 2025 8:22 AM EDT    Location:  6318/6318-01 PCP: Ignacio Poe MD       Hospital Day: 2         Date of Admission: 2025    Chief Complaint: Fever and altered mental status from facility     Hospital Course: 74 y.o. female who presented to [facility] with  PMHx significant for Alzheimer's dementia, multiple sclerosis, and hyperlipidemia who presents from her nursing facility for fever and altered mental status.  Patient was febrile to 103 in our emergency room.  COVID and flu have been negative.  Patient did have a CT scan that showed a left sided consolidation consistent with pneumonia, dilated and fluid filled esophagus.  Patient was on room air in the emergency room.  On initial ED evaluation pt was pleasantly confused and in no acute distress.  Patient was hemodynamically stable though tachycardic.  She was given Tylenol for fever.       Subjective:  Pleasantly confused; mother (Funmilayo) and caregiver at bedside; states she is at her baseline for mentation    Assessment and Recommendations:    HCAP  Sepsis     Sepsis evidenced by HR> 100, RR 19, T max 103.1   WBC 10.7, Lactic acid 1.8, /88  UA Trace blood; EKG: Sinus Tach -> HR  78 ()  Lipase 10, Trop 12, 15; NT Pro BNP 1219; TSH 0.37  VBG: pH 7.474, pCO2 51.1, pO2 31, HCO3 22.8  NEG COVID/INF; MRSA swab pending  CXR: Left basilar atelectasis.   CT HEAD:   Age related changes in the brain.   No acute abnormality seen.   CT Chest/ABD:  1. Dilated and fluid-filled esophagus.   2. Consolidation in the left lung, likely pneumonia.  1. No acute intra-abdominopelvic abnormality.   Remains HDS; afebrile today, on RA  30 mL/kg bolus administered in ED;   IVF continued @ 75 mL/hr x 24 hours  ABX IV VANC/Cefepim  Pending and FU on Inflammatory makers,   UCx, BCx pending    CBC      Recent Labs     04/18/25  1352 04/19/25  0355   * 141   K see below 3.5   CL 97* 108   CO2 19* 23   BUN 9 10   CREATININE 0.8 0.7   CALCIUM 9.0 8.7     Recent Labs     04/18/25  1352   AST 53*   ALT see below   BILIDIR see below   BILITOT 0.3   ALKPHOS 95     Recent Labs     04/18/25  1352   INR 1.13     No results for input(s): \"CKTOTAL\", \"TROPONINI\" in the last 72 hours.    Urinalysis:      Lab Results   Component Value Date/Time    NITRU Negative 04/18/2025 03:17 PM    WBCUA None seen 04/18/2025 03:17 PM    BACTERIA 1+ 05/23/2016 12:18 PM    RBCUA 0-2 04/18/2025 03:17 PM    BLOODU TRACE-INTACT 04/18/2025 03:17 PM    GLUCOSEU Negative 04/18/2025 03:17 PM       Radiology:  CT CHEST ABDOMEN PELVIS W CONTRAST Additional Contrast? None   Final Result      CHEST:      1. Dilated and fluid-filled esophagus.   2. Consolidation in the left lung, likely pneumonia.            ABDOMEN/PELVIS:      1. No acute intra-abdominopelvic abnormality.      Electronically signed by Nando Raya,       CT HEAD WO CONTRAST   Final Result      Age related changes in the brain. No acute abnormality seen.            Electronically signed by Mando Irwin      XR CHEST PORTABLE   Final Result   Left basilar atelectasis.      Electronically signed by Joel Munoz, APRN - CNP    NOTE:  This report was transcribed using voice recognition software.  Every effort was made to ensure accuracy; however, inadvertent computerized transcription errors may be present.

## 2025-04-20 LAB
ANION GAP SERPL CALCULATED.3IONS-SCNC: 10 MMOL/L (ref 3–16)
BASOPHILS # BLD: 0 K/UL (ref 0–0.2)
BASOPHILS NFR BLD: 0.6 %
BUN SERPL-MCNC: 7 MG/DL (ref 7–20)
CALCIUM SERPL-MCNC: 9 MG/DL (ref 8.3–10.6)
CHLORIDE SERPL-SCNC: 104 MMOL/L (ref 99–110)
CO2 SERPL-SCNC: 22 MMOL/L (ref 21–32)
CREAT SERPL-MCNC: 0.6 MG/DL (ref 0.6–1.2)
DEPRECATED RDW RBC AUTO: 13.6 % (ref 12.4–15.4)
EOSINOPHIL # BLD: 0 K/UL (ref 0–0.6)
EOSINOPHIL NFR BLD: 0.3 %
GFR SERPLBLD CREATININE-BSD FMLA CKD-EPI: >90 ML/MIN/{1.73_M2}
GLUCOSE SERPL-MCNC: 125 MG/DL (ref 70–99)
HCT VFR BLD AUTO: 40.1 % (ref 36–48)
HGB BLD-MCNC: 13.3 G/DL (ref 12–16)
LEGIONELLA AG UR QL: NORMAL
LYMPHOCYTES # BLD: 1.9 K/UL (ref 1–5.1)
LYMPHOCYTES NFR BLD: 25.5 %
MAGNESIUM SERPL-MCNC: 1.6 MG/DL (ref 1.8–2.4)
MCH RBC QN AUTO: 30.1 PG (ref 26–34)
MCHC RBC AUTO-ENTMCNC: 33.1 G/DL (ref 31–36)
MCV RBC AUTO: 90.9 FL (ref 80–100)
MONOCYTES # BLD: 0.5 K/UL (ref 0–1.3)
MONOCYTES NFR BLD: 6.6 %
NEUTROPHILS # BLD: 4.9 K/UL (ref 1.7–7.7)
NEUTROPHILS NFR BLD: 67 %
PLATELET # BLD AUTO: 216 K/UL (ref 135–450)
PMV BLD AUTO: 8.4 FL (ref 5–10.5)
POTASSIUM SERPL-SCNC: 3.2 MMOL/L (ref 3.5–5.1)
RBC # BLD AUTO: 4.41 M/UL (ref 4–5.2)
S PNEUM AG UR QL: NORMAL
SODIUM SERPL-SCNC: 136 MMOL/L (ref 136–145)
VANCOMYCIN SERPL-MCNC: 16.8 UG/ML
WBC # BLD AUTO: 7.3 K/UL (ref 4–11)

## 2025-04-20 PROCEDURE — 2580000003 HC RX 258: Performed by: STUDENT IN AN ORGANIZED HEALTH CARE EDUCATION/TRAINING PROGRAM

## 2025-04-20 PROCEDURE — 6370000000 HC RX 637 (ALT 250 FOR IP): Performed by: FAMILY MEDICINE

## 2025-04-20 PROCEDURE — 80048 BASIC METABOLIC PNL TOTAL CA: CPT

## 2025-04-20 PROCEDURE — 6360000002 HC RX W HCPCS: Performed by: STUDENT IN AN ORGANIZED HEALTH CARE EDUCATION/TRAINING PROGRAM

## 2025-04-20 PROCEDURE — 6360000002 HC RX W HCPCS: Performed by: FAMILY MEDICINE

## 2025-04-20 PROCEDURE — 80202 ASSAY OF VANCOMYCIN: CPT

## 2025-04-20 PROCEDURE — 6370000000 HC RX 637 (ALT 250 FOR IP): Performed by: STUDENT IN AN ORGANIZED HEALTH CARE EDUCATION/TRAINING PROGRAM

## 2025-04-20 PROCEDURE — 1200000000 HC SEMI PRIVATE

## 2025-04-20 PROCEDURE — 6370000000 HC RX 637 (ALT 250 FOR IP): Performed by: INTERNAL MEDICINE

## 2025-04-20 PROCEDURE — 36415 COLL VENOUS BLD VENIPUNCTURE: CPT

## 2025-04-20 PROCEDURE — 83735 ASSAY OF MAGNESIUM: CPT

## 2025-04-20 PROCEDURE — 2500000003 HC RX 250 WO HCPCS: Performed by: STUDENT IN AN ORGANIZED HEALTH CARE EDUCATION/TRAINING PROGRAM

## 2025-04-20 PROCEDURE — 85025 COMPLETE CBC W/AUTO DIFF WBC: CPT

## 2025-04-20 RX ORDER — POTASSIUM CHLORIDE 1500 MG/1
40 TABLET, EXTENDED RELEASE ORAL PRN
Status: DISCONTINUED | OUTPATIENT
Start: 2025-04-20 | End: 2025-04-21 | Stop reason: HOSPADM

## 2025-04-20 RX ORDER — MAGNESIUM SULFATE IN WATER 40 MG/ML
2000 INJECTION, SOLUTION INTRAVENOUS PRN
Status: DISCONTINUED | OUTPATIENT
Start: 2025-04-20 | End: 2025-04-21 | Stop reason: HOSPADM

## 2025-04-20 RX ORDER — POTASSIUM CHLORIDE 7.45 MG/ML
10 INJECTION INTRAVENOUS PRN
Status: DISCONTINUED | OUTPATIENT
Start: 2025-04-20 | End: 2025-04-21 | Stop reason: HOSPADM

## 2025-04-20 RX ORDER — DOXYCYCLINE 50 MG/1
100 CAPSULE ORAL EVERY 12 HOURS SCHEDULED
Status: DISCONTINUED | OUTPATIENT
Start: 2025-04-20 | End: 2025-04-21 | Stop reason: HOSPADM

## 2025-04-20 RX ORDER — DOXYCYCLINE 50 MG/1
100 CAPSULE ORAL EVERY 12 HOURS SCHEDULED
Status: DISCONTINUED | OUTPATIENT
Start: 2025-04-20 | End: 2025-04-20

## 2025-04-20 RX ORDER — MAGNESIUM SULFATE IN WATER 40 MG/ML
2000 INJECTION, SOLUTION INTRAVENOUS ONCE
Status: DISCONTINUED | OUTPATIENT
Start: 2025-04-20 | End: 2025-04-20

## 2025-04-20 RX ADMIN — SODIUM CHLORIDE, PRESERVATIVE FREE 10 ML: 5 INJECTION INTRAVENOUS at 22:23

## 2025-04-20 RX ADMIN — CEFEPIME 2000 MG: 2 INJECTION, POWDER, FOR SOLUTION INTRAVENOUS at 03:13

## 2025-04-20 RX ADMIN — POTASSIUM CHLORIDE 20 MEQ: 1500 TABLET, EXTENDED RELEASE ORAL at 06:20

## 2025-04-20 RX ADMIN — MAGNESIUM SULFATE HEPTAHYDRATE 2000 MG: 40 INJECTION, SOLUTION INTRAVENOUS at 07:53

## 2025-04-20 RX ADMIN — SODIUM CHLORIDE: 0.9 INJECTION, SOLUTION INTRAVENOUS at 07:51

## 2025-04-20 RX ADMIN — DONEPEZIL HYDROCHLORIDE 10 MG: 10 TABLET ORAL at 22:18

## 2025-04-20 RX ADMIN — AMOXICILLIN AND CLAVULANATE POTASSIUM 1 TABLET: 875; 125 TABLET, FILM COATED ORAL at 09:03

## 2025-04-20 RX ADMIN — DOXYCYCLINE 100 MG: 50 CAPSULE ORAL at 09:03

## 2025-04-20 RX ADMIN — DOXYCYCLINE 100 MG: 50 CAPSULE ORAL at 22:18

## 2025-04-20 RX ADMIN — ATORVASTATIN CALCIUM 40 MG: 40 TABLET, FILM COATED ORAL at 09:02

## 2025-04-20 RX ADMIN — ENOXAPARIN SODIUM 40 MG: 40 INJECTION SUBCUTANEOUS at 22:19

## 2025-04-20 RX ADMIN — Medication 2000 UNITS: at 09:02

## 2025-04-20 RX ADMIN — AMOXICILLIN AND CLAVULANATE POTASSIUM 1 TABLET: 875; 125 TABLET, FILM COATED ORAL at 22:18

## 2025-04-20 RX ADMIN — POTASSIUM BICARBONATE 20 MEQ: 782 TABLET, EFFERVESCENT ORAL at 06:31

## 2025-04-20 NOTE — PROGRESS NOTES
Patient was unable to swallow potassium pills this morning. She was able to get down 20 mEq pill form and gave her another 20 mEq in efferk. Patient in bed with call light in reach.

## 2025-04-20 NOTE — PROGRESS NOTES
Hospitalist Progress Note      Name:  Ivonne Garcia /Age/Sex: 1950  (74 y.o. female)   MRN & CSN:  [id number] & [id number] Encounter Date/Time: 2025 10:43 AM EDT    Location:  6318/6318-01 PCP: Ignacio Poe MD       Hospital Day: 3         Date of Admission: 2025    Chief Complaint: Fever and altered mental status from facility     Hospital Course: 74 y.o. female who presented to [facility] with  PMHx significant for Alzheimer's dementia, multiple sclerosis, and hyperlipidemia who presents from her nursing facility for fever and altered mental status.  Patient was febrile to 103 in our emergency room.  COVID and flu have been negative.  Patient did have a CT scan that showed a left sided consolidation consistent with pneumonia, dilated and fluid filled esophagus.  Patient was on room air in the emergency room.  On initial ED evaluation pt was pleasantly confused and in no acute distress.  Patient was hemodynamically stable though tachycardic.  She was given Tylenol for fever.   Pt has remained afebrile overnight (), urine antigens NEG. MRSA NEG, discontinued IV Vanc. Magnesium and potassium mildly decreased this AM and replaced. Transitioned to PO ABX today. PT/OT evaluation ordered. Mother states she thinks PT overdid it last time and her BP was elevated - she has private [facility] aides that are willing to complete therapy exercises. Caregiver at bedside states that she was unable to stand the other day and that is also why she was brought to the ED. Pt has not been up OOB - pending evaluation. Updated CM.     Subjective: Denies complaints     Assessment and Recommendations:    HCAP (Improved)   Sepsis (Improved)              Sepsis evidenced by HR> 100, RR 19, T max 103.1   WBC 10.7 -> 7.3 (), Lactic acid 1.8, /88  UA Trace blood; EKG: Sinus Tach -> HR  78 ()  Lipase 10, Trop 12, 15; NT Pro BNP 1219; TSH 0.37  VBG: pH 7.474, pCO2 51.1, pO2 31, HCO3 22.8  NEG  04/18/2025 03:17 PM    BLOODU TRACE-INTACT 04/18/2025 03:17 PM    GLUCOSEU Negative 04/18/2025 03:17 PM       Radiology:  FL MODIFIED BARIUM SWALLOW W VIDEO   Final Result      CT CHEST ABDOMEN PELVIS W CONTRAST Additional Contrast? None   Final Result      CHEST:      1. Dilated and fluid-filled esophagus.   2. Consolidation in the left lung, likely pneumonia.            ABDOMEN/PELVIS:      1. No acute intra-abdominopelvic abnormality.      Electronically signed by Nando Raya DO      CT HEAD WO CONTRAST   Final Result      Age related changes in the brain. No acute abnormality seen.            Electronically signed by Mando Irwin      XR CHEST PORTABLE   Final Result   Left basilar atelectasis.      Electronically signed by Joel Munoz, APRN - CNP    NOTE:  This report was transcribed using voice recognition software.  Every effort was made to ensure accuracy; however, inadvertent computerized transcription errors may be present.

## 2025-04-20 NOTE — PLAN OF CARE
Patient noted with tele stirp showing sinus rhythm. No complaints of pain thus far this shift. Patient remains free from falls and injury.  Problem: Cardiovascular - Adult  Goal: Absence of cardiac dysrhythmias or at baseline  4/20/2025 0339 by Britni Bermudez, RN  Outcome: Progressing     Problem: Safety - Adult  Goal: Free from fall injury  4/20/2025 0339 by Britni Bermudez, RN  Outcome: Progressing     Problem: Pain  Goal: Verbalizes/displays adequate comfort level or baseline comfort level  4/20/2025 0339 by Britni Bermudez, RN  Outcome: Progressing

## 2025-04-20 NOTE — CONSULTS
Clinical Pharmacy Progress Note    Vancomycin has been discontinued - Pharmacy will sign off on dosing  If resumed, please re-consult Pharmacy     Please call with questions:  [phone] (Main Pharmacy)    Sendy Torres  Pharm.D. BCPS    4/20/2025 7:40 AM

## 2025-04-20 NOTE — PLAN OF CARE
Pt alert and oriented x3, oriented/disoriented at times . Pt was able to tolerate morning med. Mag replacement done. Pt voices no complaints at this moment. Free from fall. Standard precaution in place. Plan of care ongoing.  Problem: Discharge Planning  Goal: Discharge to home or other facility with appropriate resources  4/20/2025 1516 by Yohana eSay RN  Outcome: Progressing     Problem: Respiratory - Adult  Goal: Achieves optimal ventilation and oxygenation  4/20/2025 1516 by Yohana Seay RN  Outcome: Progressing     Problem: Cardiovascular - Adult  Goal: Absence of cardiac dysrhythmias or at baseline  4/20/2025 1516 by Yohana Seay RN  Outcome: Progressing  4/20/2025 1515 by Yohana Seay RN  Outcome: Progressing     Problem: Safety - Adult  Goal: Free from fall injury  4/20/2025 1516 by Yohana Seay RN  Outcome: Progressing  4/20/2025 1515 by Yohana Seay RN  Outcome: Progressing     Problem: Pain  Goal: Verbalizes/displays adequate comfort level or baseline comfort level  4/20/2025 1516 by Yohana Seay RN  Outcome: Progressing  4/20/2025 1515 by Yohana Seay RN  Outcome: Progressing     Problem: Skin/Tissue Integrity  Goal: Skin integrity remains intact  Description: 1.  Monitor for areas of redness and/or skin breakdown2.  Assess vascular access sites hourly3.  Every 4-6 hours minimum:  Change oxygen saturation probe site4.  Every 4-6 hours:  If on nasal continuous positive airway pressure, respiratory therapy assess nares and determine need for appliance change or resting period  4/20/2025 1516 by Yohana Seay RN  Outcome: Progressing  4/20/2025 1515 by Yohana Seay RN  Outcome: Progressing

## 2025-04-21 VITALS
DIASTOLIC BLOOD PRESSURE: 77 MMHG | HEIGHT: 63 IN | BODY MASS INDEX: 25.52 KG/M2 | TEMPERATURE: 97.7 F | OXYGEN SATURATION: 98 % | WEIGHT: 144 LBS | HEART RATE: 93 BPM | SYSTOLIC BLOOD PRESSURE: 126 MMHG | RESPIRATION RATE: 16 BRPM

## 2025-04-21 LAB
ANION GAP SERPL CALCULATED.3IONS-SCNC: 8 MMOL/L (ref 3–16)
BASOPHILS # BLD: 0 K/UL (ref 0–0.2)
BASOPHILS NFR BLD: 0.5 %
BUN SERPL-MCNC: 7 MG/DL (ref 7–20)
CALCIUM SERPL-MCNC: 9.9 MG/DL (ref 8.3–10.6)
CHLORIDE SERPL-SCNC: 106 MMOL/L (ref 99–110)
CO2 SERPL-SCNC: 27 MMOL/L (ref 21–32)
CREAT SERPL-MCNC: 0.8 MG/DL (ref 0.6–1.2)
DEPRECATED RDW RBC AUTO: 13.7 % (ref 12.4–15.4)
EOSINOPHIL # BLD: 0.1 K/UL (ref 0–0.6)
EOSINOPHIL NFR BLD: 1.3 %
GFR SERPLBLD CREATININE-BSD FMLA CKD-EPI: 78 ML/MIN/{1.73_M2}
GLUCOSE SERPL-MCNC: 93 MG/DL (ref 70–99)
HCT VFR BLD AUTO: 42.7 % (ref 36–48)
HGB BLD-MCNC: 14.2 G/DL (ref 12–16)
LYMPHOCYTES # BLD: 2.9 K/UL (ref 1–5.1)
LYMPHOCYTES NFR BLD: 37.7 %
MCH RBC QN AUTO: 30.4 PG (ref 26–34)
MCHC RBC AUTO-ENTMCNC: 33.3 G/DL (ref 31–36)
MCV RBC AUTO: 91.4 FL (ref 80–100)
MONOCYTES # BLD: 0.5 K/UL (ref 0–1.3)
MONOCYTES NFR BLD: 6.3 %
NEUTROPHILS # BLD: 4.2 K/UL (ref 1.7–7.7)
NEUTROPHILS NFR BLD: 54.2 %
PLATELET # BLD AUTO: 292 K/UL (ref 135–450)
PMV BLD AUTO: 9.3 FL (ref 5–10.5)
POTASSIUM SERPL-SCNC: 5.1 MMOL/L (ref 3.5–5.1)
RBC # BLD AUTO: 4.68 M/UL (ref 4–5.2)
SODIUM SERPL-SCNC: 141 MMOL/L (ref 136–145)
WBC # BLD AUTO: 7.8 K/UL (ref 4–11)

## 2025-04-21 PROCEDURE — 80048 BASIC METABOLIC PNL TOTAL CA: CPT

## 2025-04-21 PROCEDURE — 36415 COLL VENOUS BLD VENIPUNCTURE: CPT

## 2025-04-21 PROCEDURE — 6370000000 HC RX 637 (ALT 250 FOR IP): Performed by: STUDENT IN AN ORGANIZED HEALTH CARE EDUCATION/TRAINING PROGRAM

## 2025-04-21 PROCEDURE — 85025 COMPLETE CBC W/AUTO DIFF WBC: CPT

## 2025-04-21 PROCEDURE — 97530 THERAPEUTIC ACTIVITIES: CPT

## 2025-04-21 PROCEDURE — 2500000003 HC RX 250 WO HCPCS: Performed by: STUDENT IN AN ORGANIZED HEALTH CARE EDUCATION/TRAINING PROGRAM

## 2025-04-21 PROCEDURE — 97116 GAIT TRAINING THERAPY: CPT

## 2025-04-21 PROCEDURE — 6370000000 HC RX 637 (ALT 250 FOR IP): Performed by: FAMILY MEDICINE

## 2025-04-21 PROCEDURE — 97535 SELF CARE MNGMENT TRAINING: CPT

## 2025-04-21 PROCEDURE — 97166 OT EVAL MOD COMPLEX 45 MIN: CPT

## 2025-04-21 PROCEDURE — 97162 PT EVAL MOD COMPLEX 30 MIN: CPT

## 2025-04-21 RX ORDER — DOXYCYCLINE 100 MG/1
100 CAPSULE ORAL EVERY 12 HOURS SCHEDULED
Qty: 7 CAPSULE | Refills: 0 | Status: SHIPPED | OUTPATIENT
Start: 2025-04-21 | End: 2025-04-25

## 2025-04-21 RX ADMIN — DOXYCYCLINE 100 MG: 50 CAPSULE ORAL at 10:25

## 2025-04-21 RX ADMIN — SODIUM CHLORIDE, PRESERVATIVE FREE 10 ML: 5 INJECTION INTRAVENOUS at 12:52

## 2025-04-21 RX ADMIN — Medication 2000 UNITS: at 12:49

## 2025-04-21 RX ADMIN — AMOXICILLIN AND CLAVULANATE POTASSIUM 1 TABLET: 875; 125 TABLET, FILM COATED ORAL at 10:27

## 2025-04-21 RX ADMIN — ATORVASTATIN CALCIUM 40 MG: 40 TABLET, FILM COATED ORAL at 10:27

## 2025-04-21 NOTE — PLAN OF CARE
Patient maintains adequate oxygen on room air. Remains free from falls and injury. No complaints of pain thus far this shift.  Problem: Respiratory - Adult  Goal: Achieves optimal ventilation and oxygenation  4/21/2025 0126 by Britni Bermudez, RN  Outcome: Progressing     Problem: Safety - Adult  Goal: Free from fall injury  4/21/2025 0126 by Britni Bermudez, RN  Outcome: Progressing     Problem: Pain  Goal: Verbalizes/displays adequate comfort level or baseline comfort level  4/21/2025 0126 by Britni Bermudez, RN  Outcome: Progressing      Subjective     Alona Rose is a 15 y.o. female who presents for Rash (Rash on Legs x 2 Weeks/ Here with Grandma).  Today she is accompanied by accompanied by grandmother.     HPI  Patient was seen 2 weeks ago and treated with zyrtec and hydrocortisone  Rash not improving  Rash on legs, arms and scalps  Pruritic  Was at someone's house with bedbugs when it started  No fever  Eating and drinking well    Review of Systems  ROS negative for General, Eyes, ENT, Cardiovascular, GI, , Ortho, Derm, Neuro, Psych, Lymph unless noted in the HPI above.     Objective   /69   Pulse 78   Temp 36.4 °C (97.6 °F) (Oral)   Ht 1.524 m (5')   Wt 49 kg Comment: 108lb  BMI 21.09 kg/m²   BSA: 1.44 meters squared  Growth percentiles: 15 %ile (Z= -1.04) based on [facility] (Girls, 2-20 Years) Stature-for-age data based on Stature recorded on 10/15/2024. 54 %ile (Z= 0.10) based on CDC (Girls, 2-20 Years) weight-for-age data using data from 10/15/2024.     Physical Exam  General: Well-developed, well-nourished, alert and oriented, no acute distress  Cardiac: Regular rate and rhythm, normal S1/S2, no murmurs.  Pulmonary: Clear to auscultation bilaterally, no work of breathing.  Skin: Diffuse raised white wheals with erythematous irregular histamine flares to legs and neck  Lymph: No lymphadenopathy    Assessment/Plan   Diagnoses and all orders for this visit:  Urticaria  Rash  -     hydrocortisone 2.5 % ointment; Apply 1 Application topically 2 times a day. As needed for rash or itching    Alona has hives (urticaria).  Hives can be caused by an allergic reaction or an infection or the cause may be uncertain.  Many hives are caused by infections, some are related to reactions to food or other triggers, and others are unexplained.  You can treat the hives and the itching with Benadryl or if that is too sedating, use once a day Claritin or Zyrtec. The hives will likely come and go for up to 3-4 weeks. If they begin to blister or  allergist    SIDNEY Turcios-CNP

## 2025-04-21 NOTE — PROGRESS NOTES
Physical Therapy  Facility/Department: [address]  Physical Therapy Initial Assessment and Treatment    Name: Ivonne Garcia  : 1950  MRN: [id number]  Date of Service: 2025    Discharge Recommendations:  IP Rehab   PT Equipment Recommendations  Equipment Needed:  (defer)      Patient Diagnosis(es): The primary encounter diagnosis was Fever, unspecified fever cause. Diagnoses of Tachycardia, Lethargy, Sepsis without acute organ dysfunction, due to unspecified organism (HCC), and Pneumonia of left lung due to infectious organism, unspecified part of lung were also pertinent to this visit.  Past Medical History:  has a past medical history of Hyperlipidemia and MS (multiple sclerosis) (HCC).  Past Surgical History:  has no past surgical history on file.    Assessment  Body Structures, Functions, Activity Limitations Requiring Skilled Therapeutic Intervention: Decreased functional mobility ;Decreased endurance;Decreased safe awareness;Decreased cognition;Decreased strength;Decreased balance  Assessment: Pt with decreased independent mobility from baseline.  Pt reports normally independent with mobility with rollator.  Currently needing SBA for bed mobiltiy, CGA for transfers and CGA for ambulation short distances with walker.  Pt needs cues for safety with transfers/gt.  At risk for falls.  Not safe to get up alone.  Rec cont skilled PT to maximize mobiltiy and independence.  If pt returns home, rec 24 hr capable assist and home PT.  Will follow  Treatment Diagnosis: impaired functional mobility 2/2 decreased strength and endurance  Decision Making: Medium Complexity  Requires PT Follow-Up: Yes    Plan  Physical Therapy Plan  General Plan:  (2-5)  Current Treatment Recommendations: Strengthening, Functional mobility training, Endurance training, Gait training, Safety education & training, Patient/Caregiver education & training, Balance training  Safety Devices  Type of Devices: Call light within

## 2025-04-21 NOTE — CARE COORDINATION
Case Management Assessment            Discharge Note                    Date / Time of Note: 4/21/2025 3:23 PM                  Discharge Note Completed by: Tiffany Echols    Patient Name: Ivonne Garcia   YOB: 1950  Diagnosis: Lethargy [R53.83]  Tachycardia [R00.0]  HCAP (healthcare-associated pneumonia) [J18.9]  Fever, unspecified fever cause [R50.9]  Pneumonia of left lung due to infectious organism, unspecified part of lung [J18.9]  Sepsis without acute organ dysfunction, due to unspecified organism (HCC) [A41.9]   Date / Time: 4/18/2025  1:36 PM    Current PCP: Ignacio Poe MD  Clinic patient: No    Hospitalization in the last 30 days: No       Advance Directives:  Code Status: Full Code  [place] DNR form completed and on chart: Not Indicated    Financial:  Payor: MEDICARE / Plan: MEDICARE PART A AND B / Product Type: *No Product type* /      Pharmacy:    [facility] [id number] [place], [place] - [address] [phone] -  [phone]  [address] [place] [address]  Phone: [phone] Fax: [phone]      Assistance purchasing medications?:    Assistance provided by Case Management: None at this time    Does patient want to participate in local refill/ meds to beds program?: Yes    Meds To Beds General Rules:  1. Can ONLY be done Monday- Friday between 8:30am-5pm  2. Prescription(s) must be in pharmacy by 3pm to be filled same day  3.Copy of patient's insurance/ prescription drug card and patient face sheet must be sent along with the prescription(s)  4. Cost of Rx cannot be added to hospital bill. If financial assistance is needed, please contact unit  or ;  or  CANNOT provide pharmacy voucher for patients co-pays  5. Patients can then  the prescription on their way out of the hospital at discharge, or pharmacy can deliver to the bedside if staff is available. (payment due at time of pick-up or delivery - cash,

## 2025-04-21 NOTE — PROGRESS NOTES
Pt discharged to [facility]. Belongings gathered, pt agreeable to discharge plan, pt picked up via transport.

## 2025-04-21 NOTE — PROGRESS NOTES
Occupational Therapy  Facility/Department: [address]  Occupational Therapy Initial Assessment/treatment     Name: Ivonne Garcia  : 1950  MRN: [id number]  Date of Service: 2025    Discharge Recommendations:  IP Rehab (verse home with 24hr)  OT Equipment Recommendations  Equipment Needed: No  Mobility Devices: ADL Assistive Devices  ADL Assistive Devices:  (NA)       Patient Diagnosis(es): The primary encounter diagnosis was Fever, unspecified fever cause. Diagnoses of Tachycardia, Lethargy, Sepsis without acute organ dysfunction, due to unspecified organism (HCC), and Pneumonia of left lung due to infectious organism, unspecified part of lung were also pertinent to this visit.  Past Medical History:  has a past medical history of Hyperlipidemia and MS (multiple sclerosis) ([facility]).  Past Surgical History:  has no past surgical history on file.           Assessment  Performance deficits / Impairments: Decreased functional mobility ;Decreased cognition;Decreased ADL status;Decreased endurance;Decreased strength;Decreased balance;Decreased safe awareness  Assessment: Pt resting in bed and agreeable to therapy session. Today, pt required CGA for all functional transfers/mobility with use of RW. She is limited due to decreased endurance/activity tolerance this session- her HR does range 120-135bpm following toileting tasks and short ambulation within room--RN aware. She required SBA/CGA for LB dressing and toileting tasks due to decreased safety awareness, strength, and balance. Overall, pt is limited due to weakness, decreased insight into her deficits, decreaed ind/safety with ADLs, transfers/mobility, and balance. She will benefit from inpt OT and at DC in order to progress pt in the above limitations and to  maximize her functional independence.  Prognosis: Good  Decision Making: Medium Complexity  REQUIRES OT FOLLOW-UP: Yes  Activity Tolerance  Activity Tolerance: Patient Tolerated treatment  scoop/bring food to mouth supervision--cues for encouragement and to initiate.  LE Dressing: Stand by assistance;Contact guard assistance  LE Dressing Skilled Clinical Factors: Pt don/doffs brief from toilet- threads BLE into/out of brief, up/down over hips in standing--CGA for standing balance. She requires increased time and effort and intermittent safety cues in order to successfully complete.  Toileting: Contact guard assistance  Toileting Skilled Clinical Factors: pant management. Did not urinate  Product Used : Soap and water              Vision  Vision: Impaired  Vision Exceptions: Wears glasses at all times  Hearing  Hearing: Within functional limits  Orientation  Overall Orientation Status: Impaired  Orientation Level: Oriented to person;Oriented to place;Disoriented to time;Disoriented to situation (\"1937\" despite cues and choices)                  Education Given To: Patient;Family  Education Provided: Role of Therapy;Energy Conservation;Fall Prevention Strategies;Plan of Care;Mobility Training;ADL Adaptive Strategies;Transfer Training  Education Method: Demonstration  Barriers to Learning: Cognition  Education Outcome: Verbalized understanding;Continued education needed          AM-PAC - ADL  AM-PAC Daily Activity - Inpatient   How much help is needed for putting on and taking off regular lower body clothing?: A Little  How much help is needed for bathing (which includes washing, rinsing, drying)?: A Little  How much help is needed for toileting (which includes using toilet, bedpan, or urinal)?: A Little  How much help is needed for putting on and taking off regular upper body clothing?: A Little  How much help is needed for taking care of personal grooming?: A Little  How much help for eating meals?: A Little  AM-[facility] Inpatient Daily Activity Raw Score: 18  AM-PAC Inpatient ADL T-Scale Score : 38.66  ADL Inpatient CMS 0-100% Score: 46.65  ADL Inpatient CMS G-Code Modifier : CK           Goals  Short Term  Goals  Time Frame for Short Term Goals: at DC  Short Term Goal 1: Pt will complete LB dressing, SBA  Short Term Goal 2: Pt will complete 3 consecutive ADLs in stance, SBA  Short Term Goal 3: Pt will complete functional transfers and mobility in order to participate in ADL routine, SBA  Short Term Goal 4: Pt will complete UB HEP, SBA      Therapy Time   Individual Concurrent Group Co-treatment   Time In 0938         Time Out 1017         Minutes 39             Timed Code Treatment Minutes:   10 eval + 29 treatment     Total Treatment Minutes:  39       Maria Del Rosario Lowery, OT

## 2025-04-21 NOTE — PLAN OF CARE
Problem: Discharge Planning  Goal: Discharge to home or other facility with appropriate resources  Outcome: Progressing  Flowsheets (Taken 4/21/2025 1110)  Discharge to home or other facility with appropriate resources:   Identify barriers to discharge with patient and caregiver   Arrange for needed discharge resources and transportation as appropriate   Identify discharge learning needs (meds, wound care, etc)   Refer to discharge planning if patient needs post-hospital services based on physician order or complex needs related to functional status, cognitive ability or social support system     Problem: Safety - Adult  Goal: Free from fall injury  4/21/2025 1110 by Essie Corey, RN  Outcome: Progressing  Flowsheets (Taken 4/21/2025 1110)  Free From Fall Injury:   Instruct family/caregiver on patient safety   Based on caregiver fall risk screen, instruct family/caregiver to ask for assistance with transferring infant if caregiver noted to have fall risk factors

## 2025-04-21 NOTE — CARE COORDINATION
Case Management Assessment  Initial Evaluation    Date/Time of Evaluation: 4/21/2025 11:17 AM  Assessment Completed by: Tiffany Echols    If patient is discharged prior to next notation, then this note serves as note for discharge by case management.    Patient Name: Ivonne Garcia                   YOB: 1950  Diagnosis: Lethargy [R53.83]  Tachycardia [R00.0]  HCAP (healthcare-associated pneumonia) [J18.9]  Fever, unspecified fever cause [R50.9]  Pneumonia of left lung due to infectious organism, unspecified part of lung [J18.9]  Sepsis without acute organ dysfunction, due to unspecified organism (HCC) [A41.9]                   Date / Time: 4/18/2025  1:36 PM    Patient Admission Status: Inpatient   Readmission Risk (Low < 19, Mod (19-27), High > 27): Readmission Risk Score: 12.4    Current PCP: Ignacio Poe MD  PCP verified by CM? Yes    Chart Reviewed: Yes      History Provided by: Patient  Patient Orientation: Alert and Oriented    Patient Cognition: Alert    Hospitalization in the last 30 days (Readmission):  No    If yes, Readmission Assessment in CM Navigator will be completed.    Advance Directives:      Code Status: Full Code   Patient's Primary Decision Maker is: Legal Next of Kin      Discharge Planning:    Patient lives with: Family Members Type of Home: House  Primary Care Giver: Self  Patient Support Systems include: Family Members, Home Care Staff   Current Financial resources: Medicare  Current community resources: None  Current services prior to admission: Transportation            Current DME:              Type of Home Care services:  None    ADLS  Prior functional level: Assistance with the following:, Housework, Shopping, Mobility, Cooking, Bathing  Current functional level: Assistance with the following:, Bathing, Cooking, Housework, Shopping, Mobility    PT AM-PAC:   /24  OT AM-PAC:   /24    Family can provide assistance at DC: Yes  Would you like Case Management to discuss the

## 2025-04-21 NOTE — DISCHARGE INSTR - COC
Continuity of Care Form    Patient Name: Ivonne Garcia   :  1950  MRN:  [id number]    Admit date:  2025  Discharge date:      Code Status Order: Full Code   Advance Directives:     Admitting Physician:  Joel Casillas MD  PCP: Ignacio Poe MD    Discharging Nurse: Essie  Discharging Hospital Unit/Room#: 6318/6318-01  Discharging Unit Phone Number: [phone]    Emergency Contact:   Extended Emergency Contact Information  Primary Emergency Contact: Funmilayo Garcia  Home Phone: [phone]  Relation: Parent  Secondary Emergency Contact: Venkata Garcia  Mobile Phone: [phone]  Relation: Brother/Sister    Past Surgical History:  No past surgical history on file.    Immunization History:   Immunization History   Administered Date(s) Administered    COVID-19, PFIZER Bivalent, DO NOT Dilute, (age 12y+), IM, 30 mcg/0.3 mL 10/14/2022    COVID-19, PFIZER PURPLE top, DILUTE for use, (age 12 y+), 30mcg/0.3mL 2021, 2021, 10/19/2021       Active Problems:  Patient Active Problem List   Diagnosis Code    Osteoporosis M81.0    Early onset Alzheimer's dementia without behavioral disturbance (HCC) G30.0, F02.80    Chronic fatigue syndrome G93.32    Hypercalcemia E83.52    Vitamin D deficiency E55.9    Fall (on) (from) other stairs and steps, initial encounter W10.8XXA    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   Resolved       Infection Onset Added Last Indicated Last Indicated  By Resolved Resolved By    Influenza 25 COVID-19 & Influenza Combo 25 history Infection                          Nurse Assessment:  Last Vital Signs: /77   Pulse 93   Temp 97.7 °F (36.5 °C) (Oral)   Resp 16   Ht 1.6 m (5' 3\")   Wt 65.3 kg (144 lb)   SpO2 98%   BMI 25.51 kg/m²     Last documented pain score (0-10 scale):    Last Weight:   Wt Readings from Last 1 Encounters:   25 65.3 kg (144 lb)     Mental Status:  disoriented and oriented    IV Access:  - None    Nursing Mobility/ADLs:  Walking   Assisted  Transfer  Assisted  Bathing  Assisted  Dressing  Assisted  Toileting  Assisted  Feeding  Assisted  Med Admin  Assisted  Med Delivery   crushed with applesauce    Wound Care Documentation and Therapy:        Elimination:  Continence:   Bowel: Yes and No  Bladder: Yes and No  Urinary Catheter: None   Colostomy/Ileostomy/Ileal Conduit: No       Date of Last BM: ***    Intake/Output Summary (Last 24 hours) at 2025 1308  Last data filed at 2025 1022  Gross per 24 hour   Intake 370 ml   Output 1525 ml   Net -1155 ml     I/O last 3 completed shifts:  In: 610 [P.O.:600; I.V.:10]  Out: 3300 [Urine:3300]    Safety Concerns:     At Risk for Falls    Impairments/Disabilities:      None    Nutrition Therapy:  Current Nutrition Therapy:   - Oral Diet:  Easy to Chew    Routes of Feeding: Oral  Liquids: Thin Liquids  Daily Fluid Restriction: no  Last Modified Barium Swallow with Video (Video Swallowing Test): not done    Treatments at the Time of Hospital Discharge:   Respiratory Treatments: ***  Oxygen Therapy:  is not on home oxygen therapy.  Ventilator:    - No ventilator support    Rehab Therapies: Physical Therapy and Occupational Therapy  Weight Bearing Status/Restrictions: No weight bearing restrictions  Other Medical Equipment (for information only, NOT a DME order):  walker  Other Treatments: ***    Patient's personal belongings (please select all that are

## 2025-04-21 NOTE — DISCHARGE SUMMARY
V2.0  Discharge Summary    Name:  Ivonne Garcia /Age/Sex: 1950 (74 y.o. female)   Admit Date: 2025  Discharge Date: 25    MRN & CSN:  [id number] & [id number] Encounter Date and Time 25 1:09 PM EDT    Attending:  Joel Casillas MD Discharging Provider: RENZO Lynn [facility] Hospital Course:     Brief HPI: Ivonne Garcia is a 74 y.o. female who presented to [facility] with  PMHx significant for Alzheimer's dementia, multiple sclerosis, and hyperlipidemia who presents from her nursing facility for fever and altered mental status.  Patient was febrile to 103 in our emergency room.  COVID and flu have been negative.  Patient did have a CT scan that showed a left sided consolidation consistent with pneumonia, dilated and fluid filled esophagus.  Patient was on room air in the emergency room.  On initial ED evaluation pt was pleasantly confused and in no acute distress.  Patient was hemodynamically stable though tachycardic.  She was given Tylenol for fever.   Pt has remained afebrile overnight (), urine antigens NEG. MRSA NEG, discontinued IV Vanc. Magnesium and potassium mildly decreased this AM and replaced. Transitioned to PO ABX today. PT/OT evaluation ordered. Mother states she thinks PT overdid it last time and her BP was elevated - she has private C aides that are willing to complete therapy exercises. Caregiver at bedside states that she was unable to stand the other day and that is also why she was brought to the ED. PT/OT evaluation completed. Pt tolerating PO diet, PO ABX and is medically stable for discharge.     Problems addressed during this hospitalization:     HCAP (Improved)   Sepsis (Resolved)              Sepsis evidenced by HR> 100, RR 19, T max 103.1   WBC 10.7 -> 7.3 (), Lactic acid 1.8, /88  UA Trace blood; EKG: Sinus Tach -> HR  78 ()  Lipase 10, Trop 12, 15; NT Pro BNP 1219; TSH 0.37  VBG: pH 7.474, pCO2 51.1, pO2 31,  03:36 PM    HDL 70 03/02/2017 03:36 PM    TRIG 168 03/02/2017 03:36 PM     Hemoglobin A1C:   Lab Results   Component Value Date/Time    LABA1C 5.3 03/02/2017 03:36 PM     TSH:   Lab Results   Component Value Date/Time    TSH 1.50 03/02/2017 03:36 PM     Troponin: No results found for: \"TROPONINT\"  Lactic Acid: No results for input(s): \"LACTA\" in the last 72 hours.  BNP:   Recent Labs     04/18/25  1352   PROBNP 1,219*     UA:  Lab Results   Component Value Date/Time    NITRU Negative 04/18/2025 03:17 PM    COLORU Yellow 04/18/2025 03:17 PM    PHUR 7.0 04/18/2025 03:17 PM    PHUR 7.0 05/23/2016 12:18 PM    WBCUA None seen 04/18/2025 03:17 PM    RBCUA 0-2 04/18/2025 03:17 PM    BACTERIA 1+ 05/23/2016 12:18 PM    CLARITYU Clear 04/18/2025 03:17 PM    LEUKOCYTESUR Negative 04/18/2025 03:17 PM    UROBILINOGEN 0.2 04/18/2025 03:17 PM    BILIRUBINUR Negative 04/18/2025 03:17 PM    BLOODU TRACE-INTACT 04/18/2025 03:17 PM    GLUCOSEU Negative 04/18/2025 03:17 PM    KETUA Negative 04/18/2025 03:17 PM     Urine Cultures: No results found for: \"LABURIN\"  Blood Cultures:   Lab Results   Component Value Date/Time    BC  04/18/2025 01:52 PM     No Growth to date.  Any change in status will be called.     Lab Results   Component Value Date/Time    BLOODCULT2  04/18/2025 01:52 PM     No Growth to date.  Any change in status will be called.     Organism: No results found for: \"ORG\"    Time Spent Discharging patient 35 minutes    Electronically signed by RENZO Lynn CNP on 4/21/2025 at 1:46 PM

## 2025-04-22 LAB
BACTERIA BLD CULT ORG #2: NORMAL
BACTERIA BLD CULT: NORMAL

## 2025-04-29 NOTE — PROGRESS NOTES
Physician Progress Note      PATIENT:               ISABEL CARVAJAL  CSN #:                  [id number]  :                       1950  ADMIT DATE:       2025 1:36 PM  DISCH DATE:        2025 4:26 PM  RESPONDING  PROVIDER #:        DARCI GARRETT          QUERY TEXT:    Pneumonia is documented in the H&P on 25. Please specify the type of   pneumonia and the causative organism:    The clinical indicators include:  Hx-Alzheimers, HLD, MS  Clinical indicators-CT Chest-1. Dilated and fluid-filled esophagus.2.   Consolidation in the left lung, likely pneumonia.  Tx-  Zithro IV,  Zosyn IV, Vanco IV, Cefepime IV, Oral Augmentin, Strep   pneumonia antigen, Legionella antigen, MRSA DNA nasal probe,  Options provided:  -- Treating for gram negative pneumonia  -- Treating for aspiration pneumonia  -- Other - I will add my own diagnosis  -- Disagree - Not applicable / Not valid  -- Disagree - Clinically unable to determine / Unknown  -- Refer to Clinical Documentation Reviewer    PROVIDER RESPONSE TEXT:    Possible/suspected gram negative PNA; CT Chest-1. Dilated and fluid-filled esophagus.  2. Consolidation in the left lung, likely pneumonia.   Treatment included- Zithro IV,  Zosyn IV, Vanco IV, Cefepime IV, Oral Augmentin      Query created by: Halie Woodward on 2025 9:33 AM      Electronically signed by:  DARCI GARRETT 2025 8:54 AM